# Patient Record
Sex: MALE | Race: WHITE | NOT HISPANIC OR LATINO | URBAN - METROPOLITAN AREA
[De-identification: names, ages, dates, MRNs, and addresses within clinical notes are randomized per-mention and may not be internally consistent; named-entity substitution may affect disease eponyms.]

---

## 2011-12-09 LAB — COLONOSCOPY: NORMAL

## 2017-01-28 ENCOUNTER — OFFICE VISIT (OUTPATIENT)
Dept: URGENT CARE | Facility: URGENT CARE | Age: 62
End: 2017-01-28
Payer: COMMERCIAL

## 2017-01-28 VITALS
TEMPERATURE: 97.1 F | WEIGHT: 165 LBS | HEIGHT: 73 IN | SYSTOLIC BLOOD PRESSURE: 126 MMHG | BODY MASS INDEX: 21.87 KG/M2 | OXYGEN SATURATION: 99 % | HEART RATE: 68 BPM | DIASTOLIC BLOOD PRESSURE: 83 MMHG

## 2017-01-28 DIAGNOSIS — J06.9 VIRAL UPPER RESPIRATORY TRACT INFECTION: Primary | ICD-10-CM

## 2017-01-28 PROCEDURE — 99213 OFFICE O/P EST LOW 20 MIN: CPT | Performed by: PHYSICIAN ASSISTANT

## 2017-01-28 NOTE — PROGRESS NOTES
"SUBJECTIVE:   Michele Queen is a 61 year old male presenting with a chief complaint of \"cold symptoms\", cough  and hoarse voice.  Onset of symptoms was 1 week(s) ago.  Course of illness is same.    Severity moderate  Current and Associated symptoms: sore throat  Treatment measures tried include OTC meds.  Predisposing factors include tobacco abuse last use 3 years ago. .    No past medical history on file.  Current Outpatient Prescriptions   Medication Sig Dispense Refill     NO ACTIVE MEDICATIONS        Social History   Substance Use Topics     Smoking status: Former Smoker     Types: Cigarettes     Quit date: 07/24/2014     Smokeless tobacco: Never Used     Alcohol Use: Not on file       ROS:  CONSTITUTIONAL:NEGATIVE for fever, chills, change in weight  EYES: NEGATIVE for vision changes or irritation  ENT/MOUTH: NEGATIVE for ear, mouth and throat problems  RESP:POSITIVE for cough-non productive    OBJECTIVE  :/83 mmHg  Pulse 68  Temp(Src) 97.1  F (36.2  C) (Tympanic)  Ht 6' 1\" (1.854 m)  Wt 165 lb (74.844 kg)  BMI 21.77 kg/m2  SpO2 99%  GENERAL APPEARANCE: healthy, alert and no distress  EYES: EOMI,  PERRL, conjunctiva clear  HENT: ear canals and TM's normal.  Nose and mouth without ulcers, erythema or lesions  NECK: supple, nontender, no lymphadenopathy  RESP: lungs clear to auscultation - no rales, rhonchi or wheezes  CV: regular rates and rhythm, normal S1 S2, no murmur noted  ABDOMEN:  soft, nontender, no HSM or masses and bowel sounds normal  NEURO: Normal strength and tone, sensory exam grossly normal,  normal speech and mentation  SKIN: no suspicious lesions or rashes    ASSESSMENT:    1. Viral upper respiratory tract infection      PLAN:  Cool mist vaporizer, OTC cough suppressant/expectorant and follow up with primary provider if not improving over the next week.   See orders in Epic  "

## 2017-01-28 NOTE — NURSING NOTE
"Chief Complaint   Patient presents with     Urgent Care     Cough     c/o cough for 1 week       Initial /83 mmHg  Pulse 68  Temp(Src) 97.1  F (36.2  C) (Tympanic)  Ht 6' 1\" (1.854 m)  Wt 165 lb (74.844 kg)  BMI 21.77 kg/m2  SpO2 99% Estimated body mass index is 21.77 kg/(m^2) as calculated from the following:    Height as of this encounter: 6' 1\" (1.854 m).    Weight as of this encounter: 165 lb (74.844 kg).  BP completed using cuff size: regular  Karol Zhou MA    "

## 2019-04-02 ENCOUNTER — RECORDS - HEALTHEAST (OUTPATIENT)
Dept: LAB | Facility: CLINIC | Age: 64
End: 2019-04-02

## 2019-04-02 LAB
ALBUMIN SERPL-MCNC: 4.4 G/DL (ref 3.5–5)
ALBUMIN UR-MCNC: NEGATIVE MG/DL
ALP SERPL-CCNC: 76 U/L (ref 45–120)
ALT SERPL W P-5'-P-CCNC: 22 U/L (ref 0–45)
ANION GAP SERPL CALCULATED.3IONS-SCNC: 9 MMOL/L (ref 5–18)
APPEARANCE UR: CLEAR
AST SERPL W P-5'-P-CCNC: 19 U/L (ref 0–40)
BASOPHILS # BLD AUTO: 0 THOU/UL (ref 0–0.2)
BASOPHILS NFR BLD AUTO: 1 % (ref 0–2)
BILIRUB SERPL-MCNC: 0.8 MG/DL (ref 0–1)
BILIRUB UR QL STRIP: NEGATIVE
BUN SERPL-MCNC: 14 MG/DL (ref 8–22)
CALCIUM SERPL-MCNC: 9.9 MG/DL (ref 8.5–10.5)
CHLORIDE BLD-SCNC: 102 MMOL/L (ref 98–107)
CHOLEST SERPL-MCNC: 290 MG/DL
CO2 SERPL-SCNC: 26 MMOL/L (ref 22–31)
COLOR UR AUTO: ABNORMAL
CREAT SERPL-MCNC: 0.95 MG/DL (ref 0.7–1.3)
EOSINOPHIL # BLD AUTO: 0.3 THOU/UL (ref 0–0.4)
EOSINOPHIL NFR BLD AUTO: 5 % (ref 0–6)
ERYTHROCYTE [DISTWIDTH] IN BLOOD BY AUTOMATED COUNT: 12.7 % (ref 11–14.5)
FASTING STATUS PATIENT QL REPORTED: YES
GFR SERPL CREATININE-BSD FRML MDRD: >60 ML/MIN/1.73M2
GLUCOSE BLD-MCNC: 109 MG/DL (ref 70–125)
GLUCOSE UR STRIP-MCNC: NEGATIVE MG/DL
HCT VFR BLD AUTO: 43.8 % (ref 40–54)
HDLC SERPL-MCNC: 62 MG/DL
HGB BLD-MCNC: 14.8 G/DL (ref 14–18)
HGB UR QL STRIP: NEGATIVE
KETONES UR STRIP-MCNC: NEGATIVE MG/DL
LDLC SERPL CALC-MCNC: 208 MG/DL
LEUKOCYTE ESTERASE UR QL STRIP: NEGATIVE
LYMPHOCYTES # BLD AUTO: 2.1 THOU/UL (ref 0.8–4.4)
LYMPHOCYTES NFR BLD AUTO: 33 % (ref 20–40)
MCH RBC QN AUTO: 32.8 PG (ref 27–34)
MCHC RBC AUTO-ENTMCNC: 33.8 G/DL (ref 32–36)
MCV RBC AUTO: 97 FL (ref 80–100)
MONOCYTES # BLD AUTO: 0.9 THOU/UL (ref 0–0.9)
MONOCYTES NFR BLD AUTO: 14 % (ref 2–10)
NEUTROPHILS # BLD AUTO: 3 THOU/UL (ref 2–7.7)
NEUTROPHILS NFR BLD AUTO: 48 % (ref 50–70)
NITRATE UR QL: NEGATIVE
PH UR STRIP: 7 [PH] (ref 4.5–8)
PLATELET # BLD AUTO: 278 THOU/UL (ref 140–440)
PMV BLD AUTO: 11.8 FL (ref 8.5–12.5)
POTASSIUM BLD-SCNC: 4.8 MMOL/L (ref 3.5–5)
PROT SERPL-MCNC: 7.1 G/DL (ref 6–8)
PSA SERPL-MCNC: 0.2 NG/ML (ref 0–4.5)
RBC # BLD AUTO: 4.51 MILL/UL (ref 4.4–6.2)
SODIUM SERPL-SCNC: 137 MMOL/L (ref 136–145)
SP GR UR STRIP: 1.01 (ref 1–1.03)
TRIGL SERPL-MCNC: 98 MG/DL
UROBILINOGEN UR STRIP-ACNC: ABNORMAL
WBC: 6.2 THOU/UL (ref 4–11)

## 2019-06-06 ENCOUNTER — RECORDS - HEALTHEAST (OUTPATIENT)
Dept: LAB | Facility: CLINIC | Age: 64
End: 2019-06-06

## 2019-06-06 LAB
CHOLEST SERPL-MCNC: 277 MG/DL
FASTING STATUS PATIENT QL REPORTED: YES
HDLC SERPL-MCNC: 63 MG/DL
LDLC SERPL CALC-MCNC: 200 MG/DL
TRIGL SERPL-MCNC: 70 MG/DL

## 2020-08-31 NOTE — PROGRESS NOTES
3  SUBJECTIVE:   CC: Michele Queen is an 65 year old male who presents for preventive health visit.     Healthy Habits:    Do you get at least three servings of calcium containing foods daily (dairy, green leafy vegetables, etc.)? yes    Amount of exercise or daily activities, outside of work: 4-6 day(s) per week    Problems taking medications regularly not applicable    Medication side effects: No    Have you had an eye exam in the past two years? yes    Do you see a dentist twice per year? yes    Do you have sleep apnea, excessive snoring or daytime drowsiness?no      PROBLEMS TO ADD ON...  Erectile dysfunction  - has treated this with sildenafil for years  - doesn't remember what dose he has been using  - does report he has noticed he needs to use more of the medication now than he used to  - previously spoke with his pharmacist who advised patient not to take more that 5 pills at a time (which makes me think he is taking 25mg tablets)    Today's PHQ-2 Score:   PHQ-2 (  [employer]) 2020   Q1: Little interest or pleasure in doing things 0   Q2: Feeling down, depressed or hopeless 0   PHQ-2 Score 0       Abuse: Current or Past(Physical, Sexual or Emotional)- No  Do you feel safe in your environment? Yes    Have you ever done Advance Care Planning? (For example, a Health Directive, POLST, or a discussion with a medical provider or your loved ones about your wishes): Yes, patient states has an Advance Care Planning document and will bring a copy to the clinic.    Social History     Tobacco Use     Smoking status: Former Smoker     Types: Cigarettes     Last attempt to quit: 2014     Years since quittin.1     Smokeless tobacco: Never Used   Substance Use Topics     Alcohol use: Not on file     If you drink alcohol do you typically have >3 drinks per day or >7 drinks per week? No                      Last PSA: No results found for: PSA    Reviewed orders with patient. Reviewed health maintenance and  "updated orders accordingly - Yes    Colonoscopy history:   - first colonoscopy in mid 50s, found \"polyps\" and advised to return for repeat colonoscopy in 3 years, which he did  - second colonoscopy was negative, patient told to return in 5 years, which is now  - patient believes in \"minimal\" medicine and would prefer a FIT test today and going forward    Prostate   - thinks he has had PSA testing in the past  - but again, prefers minimal testing; he denies any concerning symptoms and declines repeat PSA today    Reviewed and updated as needed this visit by clinical staff  Tobacco  Allergies  Meds  Problems  Med Hx  Surg Hx  Fam Hx         Reviewed and updated as needed this visit by Provider  Allergies  Meds  Problems  Med Hx  Surg Hx        Past Medical History:   Diagnosis Date     High cholesterol 9/1/2020     Other male erectile dysfunction 9/1/2020     Psoriasis 9/1/2020      Past Surgical History:   Procedure Laterality Date     EYE SURGERY         ROS:  CONSTITUTIONAL: NEGATIVE for fever, chills, change in weight  INTEGUMENTARY/SKIN: NEGATIVE for worrisome rashes, moles or lesions  EYES: NEGATIVE for vision changes or irritation  ENT: NEGATIVE for ear, mouth and throat problems  RESP: NEGATIVE for significant cough or SOB  CV: NEGATIVE for chest pain, palpitations or peripheral edema  GI: NEGATIVE for nausea, abdominal pain, heartburn, or change in bowel habits   male: history of erectile dysfunction.  negative for dysuria, hematuria, decreased urinary stream, urethral discharge  MUSCULOSKELETAL: NEGATIVE for significant arthralgias or myalgia  NEURO: NEGATIVE for weakness, dizziness or paresthesias  PSYCHIATRIC: NEGATIVE for changes in mood or affect    OBJECTIVE:   BP (!) 146/90 (BP Location: Right arm, Patient Position: Sitting, Cuff Size: Adult Regular)   Pulse 81   Temp 98.1  F (36.7  C) (Skin)   Resp 15   Ht 1.861 m (6' 1.27\")   Wt 79.8 kg (176 lb)   SpO2 98%   BMI 23.05 kg/m   "     Borderline blood pressure, no repeat check was done.      EXAM:  GENERAL: healthy, alert and no distress  EYES: Eyes grossly normal to inspection, PERRL and conjunctivae and sclerae normal  HENT: ear canals and TM's normal, nose and mouth without ulcers or lesions  NECK: no adenopathy, no asymmetry, masses, or scars and thyroid normal to palpation  RESP: lungs clear to auscultation - no rales, rhonchi or wheezes  CV: regular rate and rhythm, normal S1 S2, no S3 or S4, no murmur, click or rub, no peripheral edema and peripheral pulses strong  ABDOMEN: soft, nontender, no hepatosplenomegaly, no masses and bowel sounds normal  MS: no gross musculoskeletal defects noted, no edema  SKIN: no suspicious lesions or rashes  NEURO: Normal strength and tone, mentation intact and speech normal  PSYCH: mentation appears normal, affect normal/bright    Diagnostic Test Results:  Labs reviewed in Epic    ASSESSMENT/PLAN:   Michele was seen today for physical.    Diagnoses and all orders for this visit:    Routine general medical examination at a health care facility    Screening for lipid disorders  -     Lipid Panel ([place])  -     Basic Metabolic Panel ([place])    Screen for colon cancer  -     Fecal colorectal cancer screen FIT - Future (S+30); Future    Need for Tdap vaccination    Other male erectile dysfunction    Discussed ED as noted above. Advised patient to contact me with actual mg strength of his sildenafil. Advised patient if he continues to need increased doses of medication we might want to consider referral to urology for further assessment and alternative treatment options.       COUNSELING:  Reviewed preventive health counseling, as reflected in patient instructions  Special attention given to:        Regular exercise       Healthy diet/nutrition       Vision screening       Hearing screening       Immunizations    Declined: TDAP           Colon cancer screening       Prostate cancer screening        "Advance Care Planning    Estimated body mass index is 21.77 kg/m  as calculated from the following:    Height as of 1/28/17: 1.854 m (6' 1\").    Weight as of 1/28/17: 74.8 kg (165 lb).        He reports that he quit smoking about 6 years ago. His smoking use included cigarettes. He has never used smokeless tobacco.      Counseling Resources:  ATP IV Guidelines  Pooled Cohorts Equation Calculator  FRAX Risk Assessment  ICSI Preventive Guidelines  Dietary Guidelines for Americans, 2010  USDA's MyPlate  ASA Prophylaxis  Lung CA Screening    Toan Vitale MD  [facility]  "

## 2020-09-01 ENCOUNTER — OFFICE VISIT (OUTPATIENT)
Dept: FAMILY MEDICINE | Facility: CLINIC | Age: 65
End: 2020-09-01
Payer: COMMERCIAL

## 2020-09-01 VITALS
OXYGEN SATURATION: 98 % | SYSTOLIC BLOOD PRESSURE: 146 MMHG | RESPIRATION RATE: 15 BRPM | HEIGHT: 73 IN | TEMPERATURE: 98.1 F | HEART RATE: 81 BPM | WEIGHT: 176 LBS | BODY MASS INDEX: 23.33 KG/M2 | DIASTOLIC BLOOD PRESSURE: 90 MMHG

## 2020-09-01 DIAGNOSIS — Z12.11 SCREEN FOR COLON CANCER: ICD-10-CM

## 2020-09-01 DIAGNOSIS — Z13.220 SCREENING FOR LIPID DISORDERS: ICD-10-CM

## 2020-09-01 DIAGNOSIS — N52.8 OTHER MALE ERECTILE DYSFUNCTION: ICD-10-CM

## 2020-09-01 DIAGNOSIS — Z00.00 ROUTINE GENERAL MEDICAL EXAMINATION AT A HEALTH CARE FACILITY: Primary | ICD-10-CM

## 2020-09-01 DIAGNOSIS — Z23 NEED FOR TDAP VACCINATION: ICD-10-CM

## 2020-09-01 DIAGNOSIS — R73.09 ELEVATED GLUCOSE: ICD-10-CM

## 2020-09-01 PROBLEM — E78.00 HIGH CHOLESTEROL: Status: ACTIVE | Noted: 2020-09-01

## 2020-09-01 PROBLEM — L40.9 PSORIASIS: Status: ACTIVE | Noted: 2020-09-01

## 2020-09-01 LAB
BUN SERPL-MCNC: 13 MG/DL (ref 7–30)
CALCIUM SERPL-MCNC: 9.6 MG/DL (ref 8.5–10.4)
CHLORIDE SERPLBLD-SCNC: 100 MMOL/L (ref 94–109)
CHOLEST SERPL-MCNC: 207 MG/DL (ref 0–200)
CHOLEST/HDLC SERPL: 2.8 {RATIO} (ref 0–5)
CO2 SERPL-SCNC: 29 MMOL/L (ref 20–32)
CREAT SERPL-MCNC: 1 MG/DL (ref 0.8–1.5)
EGFR CALCULATED (BLACK REFERENCE): 96.7
EGFR CALCULATED (NON BLACK REFERENCE): 80
FASTING SPECIMEN: YES
GLUCOSE SERPL-MCNC: 109 MG/DL (ref 60–99)
HBA1C MFR BLD: 5.2 % (ref 4.1–5.7)
HDLC SERPL-MCNC: 74 MG/DL
LDLC SERPL CALC-MCNC: 116 MG/DL (ref 0–129)
POTASSIUM SERPL-SCNC: 4.7 MMOL/L (ref 3.4–5.3)
SODIUM SERPL-SCNC: 138 MMOL/L (ref 137.3–146.3)
TRIGL SERPL-MCNC: 87 MG/DL (ref 0–150)
VLDL-CHOLESTEROL: 17 (ref 7–32)

## 2020-09-01 RX ORDER — TRIAMCINOLONE ACETONIDE 5 MG/G
OINTMENT TOPICAL 2 TIMES DAILY
COMMUNITY
End: 2021-06-25

## 2020-09-01 RX ORDER — ATORVASTATIN CALCIUM 10 MG/1
10 TABLET, FILM COATED ORAL DAILY
COMMUNITY
End: 2020-10-21

## 2020-09-01 SDOH — HEALTH STABILITY: MENTAL HEALTH: HOW MANY DRINKS CONTAINING ALCOHOL DO YOU HAVE ON A TYPICAL DAY WHEN YOU ARE DRINKING?: 1 OR 2

## 2020-09-01 SDOH — HEALTH STABILITY: MENTAL HEALTH: HOW OFTEN DO YOU HAVE A DRINK CONTAINING ALCOHOL?: 2-3 TIMES A WEEK

## 2020-09-01 SDOH — HEALTH STABILITY: MENTAL HEALTH: HOW MANY STANDARD DRINKS CONTAINING ALCOHOL DO YOU HAVE ON A TYPICAL DAY?: 1 OR 2

## 2020-09-01 SDOH — HEALTH STABILITY: MENTAL HEALTH: HOW OFTEN DO YOU HAVE 6 OR MORE DRINKS ON ONE OCCASION?: NEVER

## 2020-09-01 SDOH — HEALTH STABILITY: MENTAL HEALTH: HOW OFTEN DO YOU HAVE SIX OR MORE DRINKS ON ONE OCCASION?: NEVER

## 2020-09-01 ASSESSMENT — MIFFLIN-ST. JEOR: SCORE: 1646.46

## 2020-09-01 NOTE — NURSING NOTE
"65 year old  Chief Complaint   Patient presents with     Physical     no other concerns       Blood pressure (!) 146/90, pulse 81, temperature 98.1  F (36.7  C), temperature source Skin, resp. rate 15, height 1.861 m (6' 1.27\"), weight 79.8 kg (176 lb), SpO2 98 %. Body mass index is 23.05 kg/m .  There is no problem list on file for this patient.      Wt Readings from Last 2 Encounters:   20 79.8 kg (176 lb)   17 74.8 kg (165 lb)     BP Readings from Last 3 Encounters:   20 (!) 146/90   17 126/83   16 110/80         Current Outpatient Medications   Medication     atorvastatin (LIPITOR) 10 MG tablet     triamcinolone (KENALOG) 0.5 % external ointment     NO ACTIVE MEDICATIONS     No current facility-administered medications for this visit.        Social History     Tobacco Use     Smoking status: Former Smoker     Types: Cigarettes     Last attempt to quit: 2014     Years since quittin.1     Smokeless tobacco: Never Used   Substance Use Topics     Alcohol use: Yes     Alcohol/week: 0.0 standard drinks     Frequency: 2-3 times a week     Drinks per session: 1 or 2     Binge frequency: Never     Drug use: Never       Health Maintenance Due   Topic Date Due     PREVENTIVE CARE VISIT  1956     HEPATITIS C SCREENING  1956     ADVANCE CARE PLANNING  1956     COLORECTAL CANCER SCREENING  1966     HIV SCREENING  1971     DTAP/TDAP/TD IMMUNIZATION (1 - Tdap) 1981     LIPID  1991     ZOSTER IMMUNIZATION (1 of 2) 2006     PHQ-2  2020     INFLUENZA VACCINE (1) 2020       No results found for: PAP      2020 9:10 AM    "

## 2020-09-01 NOTE — LETTER
September 4, 2020      Michele Queen  [address] [place] [address]        Magali Jamesald,     Here are the results from your labs in clinic the other day. Everything looks good. I don't see anything in your results that I am concerned about. I don't have results on the FIT test yet, but I will let you know as soon as I do. Feel free to contact me here if you have any questions about these results.     Best Wishes,     Toan Vitale MD   5:51 PM, September 3, 2020     Resulted Orders   Lipid Panel ([place])   Result Value Ref Range    FASTING SPECIMEN yes     Cholesterol 207.0 (H) 0.0 - 200.0    HDL Cholesterol 74.0 >40.0    Triglycerides 87.0 0.0 - 150.0    Cholesterol/HDL Ratio 2.8 0.0 - 5.0    LDL Cholesterol Direct 116.0 0.0 - 129.0    VLDL-Cholesterol 17.0 7.0 - 32.0   Basic Metabolic Panel ([place])   Result Value Ref Range    Glucose 109.0 (H) 60.0 - 99.0 mg/dL    Urea Nitrogen 13.0 7.0 - 30.0 mg/dL    Calcium 9.6 8.5 - 10.4 mg/dL    Creatinine 1.0 0.8 - 1.5 mg/dL    eGFR Calculated (Non Black Reference) 80.0 >60.0    eGFR Calculated (Black Reference) 96.7 >60.0    Sodium 138.0 137.3 - 146.3 mmol/L    Potassium 4.7 3.4 - 5.3 mmol/L    Chloride 100.0 94.0 - 109.0 mmol/L    Carbon Dioxide 29.0 20.0 - 32.0 mmol/L   Hemoglobin A1c ([place])   Result Value Ref Range    Hemoglobin A1C 5.2 4.1 - 5.7 %

## 2020-10-20 ENCOUNTER — MYC MEDICAL ADVICE (OUTPATIENT)
Dept: FAMILY MEDICINE | Facility: CLINIC | Age: 65
End: 2020-10-20

## 2020-10-20 DIAGNOSIS — E78.00 HIGH CHOLESTEROL: Primary | ICD-10-CM

## 2020-10-21 RX ORDER — ATORVASTATIN CALCIUM 10 MG/1
10 TABLET, FILM COATED ORAL DAILY
Qty: 90 TABLET | Refills: 3 | Status: SHIPPED | OUTPATIENT
Start: 2020-10-21 | End: 2021-11-08

## 2020-10-21 NOTE — TELEPHONE ENCOUNTER
[facility] Agreed to refill atorvastatin as noted below.     Diagnoses and all orders for this visit:    High cholesterol  -     atorvastatin (LIPITOR) 10 MG tablet; Take 1 tablet (10 mg) by mouth daily      Toan Vitale MD  1:17 PM, October 21, 2020

## 2020-11-05 ENCOUNTER — HOSPITAL ENCOUNTER (OUTPATIENT)
Facility: CLINIC | Age: 65
Setting detail: SPECIMEN
Discharge: HOME | End: 2020-11-05
Admitting: FAMILY MEDICINE

## 2020-11-05 PROCEDURE — 82274 ASSAY TEST FOR BLOOD FECAL: CPT | Performed by: FAMILY MEDICINE

## 2020-11-07 DIAGNOSIS — Z12.11 SCREEN FOR COLON CANCER: ICD-10-CM

## 2020-11-07 LAB — HEMOCCULT STL QL IA: NEGATIVE

## 2020-11-11 ENCOUNTER — MYC MEDICAL ADVICE (OUTPATIENT)
Dept: FAMILY MEDICINE | Facility: CLINIC | Age: 65
End: 2020-11-11

## 2020-11-11 DIAGNOSIS — N52.8 OTHER MALE ERECTILE DYSFUNCTION: Primary | ICD-10-CM

## 2020-11-13 RX ORDER — SILDENAFIL 50 MG/1
50-100 TABLET, FILM COATED ORAL DAILY PRN
Qty: 30 TABLET | Refills: 3 | Status: SHIPPED | OUTPATIENT
Start: 2020-11-13 | End: 2021-06-16

## 2020-11-13 NOTE — TELEPHONE ENCOUNTER
[facility] correspondence: patient requesting refill of Sildenafil. Has been taking this medication for years without complication although does report needing to take larger doses over time. Discussed the option of meeting with urology but patient would like to hold off on this for now.    Diagnoses and all orders for this visit:    Other male erectile dysfunction  -     sildenafil (VIAGRA) 50 MG tablet; Take 1-2 tablets ( mg) by mouth daily as needed (Erectile Dysfunction)      Toan Vitale MD  8:12 AM, November 13, 2020

## 2020-12-14 ENCOUNTER — HEALTH MAINTENANCE LETTER (OUTPATIENT)
Age: 65
End: 2020-12-14

## 2021-06-15 DIAGNOSIS — N52.8 OTHER MALE ERECTILE DYSFUNCTION: ICD-10-CM

## 2021-06-15 NOTE — TELEPHONE ENCOUNTER
Last time prescribed: 11/13/20 , 30 tabs/caps x 3 refills  Last office visit: 9/1/20  Next appointment: No Future Appointment Scheduled    Prescription approved per [facility] Refill Protocol.  Jennifer Escudero RN  [facility]

## 2021-06-16 RX ORDER — SILDENAFIL 50 MG/1
50-100 TABLET, FILM COATED ORAL DAILY PRN
Qty: 30 TABLET | Refills: 3 | Status: SHIPPED | OUTPATIENT
Start: 2021-06-16 | End: 2022-02-21

## 2021-06-25 DIAGNOSIS — L40.9 PSORIASIS: Primary | ICD-10-CM

## 2021-06-25 RX ORDER — TACROLIMUS 1 MG/G
OINTMENT TOPICAL 2 TIMES DAILY
Qty: 30 G | Refills: 2 | Status: SHIPPED | OUTPATIENT
Start: 2021-06-25 | End: 2022-07-14

## 2021-06-25 NOTE — TELEPHONE ENCOUNTER
Last Office Visit: 9/1/20- new pt to [facility]  Future [facility] Appointments: None  Medication last refilled:per pharmacy ( from  Previous MD Kiran Preciado, script of 5/2020),  for 30 g on 3/8/21     Routing refill request to provider for review/approval because:  Drug not active on patient's medication list    We have never filled this med for pt.  Past MD did as noted above.    Pt using for psoriasis. Spoke with pt, he uses sparingly when gets his flares of psoriasis.       Nirmala Gutierrez RN  June 25, 2021 9:39 AM

## 2021-06-25 NOTE — TELEPHONE ENCOUNTER
Agreed to refill med as noted below.    Michele was seen today for refill request.    Diagnoses and all orders for this visit:    Psoriasis  -     tacrolimus (PROTOPIC) 0.1 % external ointment; Apply topically 2 times daily As needed, to affected skin, using small amount.      Toan Vitale MD  9:57 AM, June 25, 2021

## 2021-07-06 ENCOUNTER — TELEPHONE (OUTPATIENT)
Dept: FAMILY MEDICINE | Facility: CLINIC | Age: 66
End: 2021-07-06

## 2021-07-06 NOTE — TELEPHONE ENCOUNTER
Prior Authorization Approval    Authorization Effective Date: 4/7/2021  Authorization Expiration Date: 7/6/2022  Medication: tacrolimus ointment 0.1%-PA APPROVED   Approved Dose/Quantity:   Reference #:     Insurance Company: [employer] [place] - Phone [phone] Fax [phone]  Expected CoPay:       CoPay Card Available:      Foundation Assistance Needed:    Which Pharmacy is filling the prescription (Not needed for infusion/clinic administered): [facility] - [place], [place] - [address]  Pharmacy Notified: Yes- **Instructed pharmacy to notify patient when script is ready to /ship.**  Patient Notified: Yes

## 2021-07-06 NOTE — TELEPHONE ENCOUNTER
Prior Authorization Retail Medication Request    Medication/Dose: tacrolimus ointment 0.1%  ICD code (if different than what is on RX):  same  Previously Tried and Failed:    Rationale:      Insurance Name:  same  Insurance ID:  same      Pharmacy Information (if different than what is on RX)  Name:  [facility]  Phone:  Same  Jennifer Escudero RN  [facility]

## 2021-07-06 NOTE — TELEPHONE ENCOUNTER
Central Prior Authorization Team   [phone]    PA Initiation    Medication: tacrolimus ointment 0.1%  Insurance Company: [facility] - Phone [phone] Fax [phone]  Pharmacy Filling the Rx: [facility] - [place], [place] - [facility] [address]  Filling Pharmacy Phone: [phone]  Filling Pharmacy Fax: [phone]  Start Date: 7/6/2021

## 2021-08-23 ENCOUNTER — OFFICE VISIT (OUTPATIENT)
Dept: FAMILY MEDICINE | Facility: CLINIC | Age: 66
End: 2021-08-23
Payer: COMMERCIAL

## 2021-08-23 VITALS
BODY MASS INDEX: 23.03 KG/M2 | WEIGHT: 173.75 LBS | TEMPERATURE: 97.2 F | OXYGEN SATURATION: 99 % | DIASTOLIC BLOOD PRESSURE: 69 MMHG | HEIGHT: 73 IN | SYSTOLIC BLOOD PRESSURE: 100 MMHG | RESPIRATION RATE: 16 BRPM | HEART RATE: 86 BPM

## 2021-08-23 DIAGNOSIS — Z00.00 ROUTINE GENERAL MEDICAL EXAMINATION AT A HEALTH CARE FACILITY: Primary | ICD-10-CM

## 2021-08-23 DIAGNOSIS — Z12.5 SCREENING FOR PROSTATE CANCER: ICD-10-CM

## 2021-08-23 DIAGNOSIS — Z13.6 SCREENING FOR AAA (ABDOMINAL AORTIC ANEURYSM): ICD-10-CM

## 2021-08-23 DIAGNOSIS — Z13.220 LIPID SCREENING: ICD-10-CM

## 2021-08-23 LAB
ANION GAP SERPL CALCULATED.3IONS-SCNC: 3 MMOL/L (ref 3–14)
BUN SERPL-MCNC: 12 MG/DL (ref 7–30)
CALCIUM SERPL-MCNC: 8.9 MG/DL (ref 8.5–10.1)
CHLORIDE BLD-SCNC: 104 MMOL/L (ref 94–109)
CHOLEST SERPL-MCNC: 184 MG/DL
CO2 SERPL-SCNC: 30 MMOL/L (ref 20–32)
CREAT SERPL-MCNC: 0.91 MG/DL (ref 0.66–1.25)
FASTING STATUS PATIENT QL REPORTED: YES
GFR SERPL CREATININE-BSD FRML MDRD: 88 ML/MIN/1.73M2
GLUCOSE BLD-MCNC: 100 MG/DL (ref 70–99)
HDLC SERPL-MCNC: 70 MG/DL
LDLC SERPL CALC-MCNC: 104 MG/DL
NONHDLC SERPL-MCNC: 114 MG/DL
POTASSIUM BLD-SCNC: 5.2 MMOL/L (ref 3.4–5.3)
PSA SERPL-MCNC: 0.23 UG/L (ref 0–4)
SODIUM SERPL-SCNC: 137 MMOL/L (ref 133–144)
TRIGL SERPL-MCNC: 51 MG/DL

## 2021-08-23 PROCEDURE — 80061 LIPID PANEL: CPT | Performed by: FAMILY MEDICINE

## 2021-08-23 PROCEDURE — G0103 PSA SCREENING: HCPCS | Performed by: FAMILY MEDICINE

## 2021-08-23 PROCEDURE — 80048 BASIC METABOLIC PNL TOTAL CA: CPT | Performed by: FAMILY MEDICINE

## 2021-08-23 ASSESSMENT — PATIENT HEALTH QUESTIONNAIRE - PHQ9
5. POOR APPETITE OR OVEREATING: NOT AT ALL
SUM OF ALL RESPONSES TO PHQ QUESTIONS 1-9: 0

## 2021-08-23 ASSESSMENT — ANXIETY QUESTIONNAIRES
5. BEING SO RESTLESS THAT IT IS HARD TO SIT STILL: NOT AT ALL
1. FEELING NERVOUS, ANXIOUS, OR ON EDGE: NOT AT ALL
3. WORRYING TOO MUCH ABOUT DIFFERENT THINGS: NOT AT ALL
2. NOT BEING ABLE TO STOP OR CONTROL WORRYING: NOT AT ALL
7. FEELING AFRAID AS IF SOMETHING AWFUL MIGHT HAPPEN: NOT AT ALL
GAD7 TOTAL SCORE: 0
6. BECOMING EASILY ANNOYED OR IRRITABLE: NOT AT ALL

## 2021-08-23 ASSESSMENT — MIFFLIN-ST. JEOR: SCORE: 1630.61

## 2021-08-23 NOTE — PATIENT INSTRUCTIONS
Preventive Health Recommendations:     See your health care provider every year to    Review health changes.     Discuss preventive care.      Review your medicines if your doctor has prescribed any.      Talk with your health care provider about whether you should have a test to screen for prostate cancer (PSA).    Every 3 years, have a diabetes test (fasting glucose). If you are at risk for diabetes, you should have this test more often.    Every 5 years, have a cholesterol test. Have this test more often if you are at risk for high cholesterol or heart disease.     Every 10 years, have a colonoscopy. Or, have a yearly FIT test (stool test). These exams will check for colon cancer.    Talk to with your health care provider about screening for Abdominal Aortic Aneurysm if you have a family history of AAA or have a history of smoking.    Shots:     Get a flu shot each year.     Get a tetanus shot every 10 years.     Talk to your doctor about your pneumonia vaccines. There are now two you should receive - Pneumovax (PPSV 23) and Prevnar (PCV 13).     Talk to your pharmacist about a shingles vaccine.     Talk to your doctor about the hepatitis B vaccine.  Nutrition:     Eat at least 5 servings of fruits and vegetables each day.     Eat whole-grain bread, whole-wheat pasta and brown rice instead of white grains and rice.     Get adequate Calcium and Vitamin D.   Lifestyle    Exercise for at least 150 minutes a week (30 minutes a day, 5 days a week). This will help you control your weight and prevent disease.     Limit alcohol to one drink per day.     No smoking.     Wear sunscreen to prevent skin cancer.    See your dentist every six months for an exam and cleaning.    See your eye doctor every 1 to 2 years to screen for conditions such as glaucoma, macular degeneration, cataracts, etc.    Personalized Prevention Plan  You are due for the preventive services outlined below.  Your care team is available to assist you  in scheduling these services.  If you have already completed any of these items, please share that information with your care team to update in your medical record.  Health Maintenance Due   Topic Date Due     HIV Screening  Never done     Hepatitis C Screening  Never done     Diptheria Tetanus Pertussis (DTAP/TDAP/TD) Vaccine (1 - Tdap) Never done     Zoster (Shingles) Vaccine (1 of 2) Never done     Colorectal Cancer Screening  11/06/2020     FALL RISK ASSESSMENT  Never done     AORTIC ANEURYSM SCREENING (SYSTEM ASSIGNED)  Never done     Pneumococcal Vaccine (1 of 1 - PPSV23) 04/12/2021

## 2021-08-23 NOTE — NURSING NOTE
"66 year old  Chief Complaint   Patient presents with     Medicare Visit     66 yrs old fasting        Blood pressure 100/69, pulse 86, temperature 97.2  F (36.2  C), temperature source Temporal, resp. rate 16, height 1.86 m (6' 1.23\"), weight 78.8 kg (173 lb 12 oz), SpO2 99 %. Body mass index is 22.78 kg/m .  Patient Active Problem List   Diagnosis     High cholesterol     Psoriasis     Other male erectile dysfunction       Wt Readings from Last 2 Encounters:   21 78.8 kg (173 lb 12 oz)   20 79.8 kg (176 lb)     BP Readings from Last 3 Encounters:   21 100/69   20 (!) 146/90   17 126/83         Current Outpatient Medications   Medication     atorvastatin (LIPITOR) 10 MG tablet     sildenafil (VIAGRA) 50 MG tablet     tacrolimus (PROTOPIC) 0.1 % external ointment     NO ACTIVE MEDICATIONS     No current facility-administered medications for this visit.       Social History     Tobacco Use     Smoking status: Former Smoker     Types: Cigarettes     Quit date: 2014     Years since quittin.0     Smokeless tobacco: Never Used   Substance Use Topics     Alcohol use: Yes     Alcohol/week: 0.0 standard drinks     Drug use: Never       Health Maintenance Due   Topic Date Due     HIV SCREENING  Never done     HEPATITIS C SCREENING  Never done     DTAP/TDAP/TD IMMUNIZATION (1 - Tdap) Never done     ZOSTER IMMUNIZATION (1 of 2) Never done     COLORECTAL CANCER SCREENING  2020     PHQ-2  2021     FALL RISK ASSESSMENT  Never done     AORTIC ANEURYSM SCREENING (SYSTEM ASSIGNED)  Never done     Pneumococcal Vaccine: 65+ Years (1 of 1 - PPSV23) 2021     MEDICARE ANNUAL WELLNESS VISIT  2021       No results found for: PAP      2021 8:30 AM  "

## 2021-08-23 NOTE — PROGRESS NOTES
SUBJECTIVE:   Michele Queen is a 66 year old male who presents for Preventive Visit.    Patient has been advised of split billing requirements and indicates understanding: Yes   Are you in the first 12 months of your Medicare coverage?  Yes. Vision screen deferred today.     HPI   - 66 year old male  - eats a healthy diet, does most of the cooking, lots of green leafy vegetables, lean meats  - exercises most days, likes to ride bike  - takes 100mg of sildenafil for effect, concerned this may not be enough in the future. Otherwise no concerns or side effects with medications  - sees a dentist and eye doctor regularly  - no concerns for sleep apnea        Do you feel safe in your environment? Yes    Have you ever done Advance Care Planning? (For example, a Health Directive, POLST, or a discussion with a medical provider or your loved ones about your wishes): Yes, patient states has an Advance Care Planning document and will bring a copy to the clinic.      Fall risk  Fallen 2 or more times in the past year?: No  Any fall with injury in the past year?: No    Cognitive Screening   No concerns for cognitive decline at today's visit    Do you have sleep apnea, excessive snoring or daytime drowsiness?: no    Reviewed and updated as needed this visit by clinical staff  Tobacco  Allergies  Meds              Reviewed and updated as needed this visit by Provider                Social History     Tobacco Use     Smoking status: Former Smoker     Types: Cigarettes     Quit date: 2014     Years since quittin.0     Smokeless tobacco: Never Used   Substance Use Topics     Alcohol use: Yes     Alcohol/week: 0.0 standard drinks     If you drink alcohol do you typically have >3 drinks per day or >7 drinks per week? No        PROBLEMS TO ADD ON...  Issues today  ED as noted above.   - concerned the 100mg sildenafil may start to become less effective over time  - curious about other possible treatments    Does he need to  "keep taking atorvastatin?  - reviewed his previous lipids with him   - marked decline in LDL and increase in HDL with low dose Lipitor  - denies any concerning side effects    Current providers sharing in care for this patient include:   Patient Care Team:  Toan Vitale MD as PCP - General (Family Medicine)  Toan Vitale MD as Assigned PCP    The following health maintenance items are reviewed in Epic and correct as of today:  Health Maintenance Due   Topic Date Due     HIV SCREENING  Never done     HEPATITIS C SCREENING  Never done     DTAP/TDAP/TD IMMUNIZATION (1 - Tdap) Never done     ZOSTER IMMUNIZATION (1 of 2) Never done     COLORECTAL CANCER SCREENING  11/06/2020     FALL RISK ASSESSMENT  Never done     AORTIC ANEURYSM SCREENING (SYSTEM ASSIGNED)  Never done     Pneumococcal Vaccine: 65+ Years (1 of 1 - PPSV23) 04/12/2021       Review of Systems  Constitutional, HEENT, cardiovascular, pulmonary, gi and gu systems are negative, except as otherwise noted.    OBJECTIVE:   /69 (BP Location: Left arm, Patient Position: Sitting, Cuff Size: Adult Large)   Pulse 86   Temp 97.2  F (36.2  C) (Temporal)   Resp 16   Ht 1.86 m (6' 1.23\")   Wt 78.8 kg (173 lb 12 oz)   SpO2 99%   BMI 22.78 kg/m   Estimated body mass index is 22.78 kg/m  as calculated from the following:    Height as of this encounter: 1.86 m (6' 1.23\").    Weight as of this encounter: 78.8 kg (173 lb 12 oz).  Physical Exam  GENERAL: healthy, alert and no distress  EYES: Eyes grossly normal to inspection, PERRL and conjunctivae and sclerae normal  HENT: ear canals and TM's normal, nose and mouth without ulcers or lesions  NECK: no adenopathy, no asymmetry, masses, or scars and thyroid normal to palpation  RESP: lungs clear to auscultation - no rales, rhonchi or wheezes  CV: regular rate and rhythm, normal S1 S2, no S3 or S4, no murmur, click or rub, no peripheral edema and peripheral pulses strong  ABDOMEN: soft, nontender, no " "hepatosplenomegaly, no masses and bowel sounds normal  MS: no gross musculoskeletal defects noted, no edema  SKIN: no suspicious lesions or rashes  NEURO: Normal strength and tone, mentation intact and speech normal  PSYCH: mentation appears normal, affect normal/bright    Diagnostic Test Results:  Labs reviewed in Epic    ASSESSMENT / PLAN:   Michele was seen today for medicare visit.    Diagnoses and all orders for this visit:    Routine general medical examination at a health care facility  -     Basic metabolic panel; Future  -     Cancel: Prostate spec antigen screen; Future  -     Prostate spec antigen screen; Future  -     Basic metabolic panel  -     Prostate spec antigen screen    Lipid screening  -     Lipid Profile; Future  -     Lipid Profile    Screening for prostate cancer  -     Prostate spec antigen screen; Future  -     Prostate spec antigen screen    After discussion and review of improvement in lipids with atorvastatin, patient agrees to continue with statin for now.     Discussed possible options for refractory ED including injections and prosthetic. Suggested possible referral to Urology. Patient will consider this option and get back to me.     Patient has been advised of split billing requirements and indicates understanding: Yes     COUNSELING:  Reviewed preventive health counseling, as reflected in patient instructions    Estimated body mass index is 22.78 kg/m  as calculated from the following:    Height as of this encounter: 1.86 m (6' 1.23\").    Weight as of this encounter: 78.8 kg (173 lb 12 oz).        He reports that he quit smoking about 7 years ago. His smoking use included cigarettes. He has never used smokeless tobacco.      Appropriate preventive services were discussed with this patient, including applicable screening as appropriate for cardiovascular disease, diabetes, osteopenia/osteoporosis, and glaucoma.  As appropriate for age/gender, discussed screening for colorectal " cancer, prostate cancer, breast cancer, and cervical cancer. Checklist reviewing preventive services available has been given to the patient.    Reviewed patients plan of care and provided an AVS. The Basic Care Plan (routine screening as documented in Health Maintenance) for Michele meets the Care Plan requirement. This Care Plan has been established and reviewed with the Patient.    Counseling Resources:  ATP IV Guidelines  Pooled Cohorts Equation Calculator  Breast Cancer Risk Calculator  Breast Cancer: Medication to Reduce Risk  FRAX Risk Assessment  ICSI Preventive Guidelines  Dietary Guidelines for Americans, 2010  USDA's MyPlate  ASA Prophylaxis  Lung CA Screening    Toan Vitale MD  [facility]

## 2021-08-24 ASSESSMENT — ANXIETY QUESTIONNAIRES: GAD7 TOTAL SCORE: 0

## 2021-10-02 ENCOUNTER — HEALTH MAINTENANCE LETTER (OUTPATIENT)
Age: 66
End: 2021-10-02

## 2021-11-08 DIAGNOSIS — E78.00 HIGH CHOLESTEROL: ICD-10-CM

## 2021-11-08 RX ORDER — ATORVASTATIN CALCIUM 10 MG/1
10 TABLET, FILM COATED ORAL DAILY
Qty: 90 TABLET | Refills: 2 | Status: SHIPPED | OUTPATIENT
Start: 2021-11-08 | End: 2022-07-14

## 2021-11-08 NOTE — TELEPHONE ENCOUNTER
Medication requested: atorvastatin (LIPITOR) 10 MG tablet  Last office visit: 8/23/21  [facility] appointments: none  Medication last refilled: 10/21/20 #90 + 3 refills  Last qualifying labs: 8/23/21  Prescription approved per Refill Protocol.  Amber Hooker MS RN-BC  11/08/21  10:30 AM

## 2022-02-21 DIAGNOSIS — N52.8 OTHER MALE ERECTILE DYSFUNCTION: ICD-10-CM

## 2022-02-21 RX ORDER — SILDENAFIL 50 MG/1
50-100 TABLET, FILM COATED ORAL DAILY PRN
Qty: 30 TABLET | Refills: 3 | Status: SHIPPED | OUTPATIENT
Start: 2022-02-21 | End: 2024-03-28

## 2022-02-21 NOTE — TELEPHONE ENCOUNTER
Sildenafil (Viagra) 50 mg    Last Office Visit: 8/23/21  [place] [facility] [place]  Medication last refilled: 6/16/21 #30 with 3 refill(s)    Vital Signs 1/28/2017 9/1/2020 8/23/2021   Systolic 126 146 100   Diastolic 83 90 69     Prescription approved per [facility] Refill Protocol.    Kenya White, RN, BSN

## 2022-07-13 DIAGNOSIS — E78.00 HIGH CHOLESTEROL: ICD-10-CM

## 2022-07-13 DIAGNOSIS — L40.9 PSORIASIS: ICD-10-CM

## 2022-07-14 RX ORDER — TACROLIMUS 1 MG/G
OINTMENT TOPICAL 2 TIMES DAILY
Qty: 30 G | Refills: 0 | Status: SHIPPED | OUTPATIENT
Start: 2022-07-14 | End: 2023-09-12

## 2022-07-14 RX ORDER — ATORVASTATIN CALCIUM 10 MG/1
10 TABLET, FILM COATED ORAL DAILY
Qty: 30 TABLET | Refills: 0 | Status: SHIPPED | OUTPATIENT
Start: 2022-07-14 | End: 2022-11-18

## 2022-07-14 NOTE — TELEPHONE ENCOUNTER
Atorvastatin (Lipitor) 10 mg + Tacrolimus (Protopic) 0.1%    Last Office Visit: 8/23/21  Future [facility] Appointments: None  Medication last refilled:     11/8/21 #90 with 2 refill(s)-Atorvastatin  6/25/21 #30 g with 2 refill(s)-Tacrolimus    Vital Signs 1/28/2017 9/1/2020 8/23/2021   Systolic 126 146 100   Diastolic 83 90 69     Required labs per protocol:    LAB REF RANGE 9/1/20 8/23/21   LDL < 100 mg/dL 116 High 104 High     Medication is being filled for 1 time refill only due to:  Patient needs labs Lipid panel. Patient needs to be seen because due for yearly recheck .    Plandree message sent to Don to call and schedule an appointment.    TONYA FaithN, RN, CCM

## 2022-09-03 ENCOUNTER — HEALTH MAINTENANCE LETTER (OUTPATIENT)
Age: 67
End: 2022-09-03

## 2022-11-04 ENCOUNTER — OFFICE VISIT (OUTPATIENT)
Dept: URGENT CARE | Facility: URGENT CARE | Age: 67
End: 2022-11-04
Payer: COMMERCIAL

## 2022-11-04 ENCOUNTER — NURSE TRIAGE (OUTPATIENT)
Dept: NURSING | Facility: CLINIC | Age: 67
End: 2022-11-04

## 2022-11-04 VITALS
DIASTOLIC BLOOD PRESSURE: 84 MMHG | TEMPERATURE: 98.3 F | OXYGEN SATURATION: 97 % | SYSTOLIC BLOOD PRESSURE: 118 MMHG | HEART RATE: 103 BPM

## 2022-11-04 DIAGNOSIS — J04.0 LARYNGITIS: Primary | ICD-10-CM

## 2022-11-04 LAB
DEPRECATED S PYO AG THROAT QL EIA: NEGATIVE
GROUP A STREP BY PCR: NOT DETECTED

## 2022-11-04 PROCEDURE — 99213 OFFICE O/P EST LOW 20 MIN: CPT | Performed by: EMERGENCY MEDICINE

## 2022-11-04 PROCEDURE — 87651 STREP A DNA AMP PROBE: CPT | Performed by: EMERGENCY MEDICINE

## 2022-11-04 RX ORDER — DEXAMETHASONE SODIUM PHOSPHATE 4 MG/ML
10 VIAL (ML) INJECTION ONCE
Status: DISCONTINUED | OUTPATIENT
Start: 2022-11-04 | End: 2022-11-04

## 2022-11-04 RX ORDER — DEXAMETHASONE SODIUM PHOSPHATE 10 MG/ML
10 INJECTION INTRAMUSCULAR; INTRAVENOUS ONCE
Status: COMPLETED | OUTPATIENT
Start: 2022-11-04 | End: 2022-11-04

## 2022-11-04 RX ADMIN — DEXAMETHASONE SODIUM PHOSPHATE 10 MG: 10 INJECTION INTRAMUSCULAR; INTRAVENOUS at 10:59

## 2022-11-04 NOTE — PROGRESS NOTES
Assessment & Plan     Diagnosis:    (J04.0) Laryngitis  (primary encounter diagnosis)      Medical Decision Making:  Michele Queen is a 67 year old male presented with sore throat and clinical evidence of pharyngitis and laryngitis.  The rapid strep test is negative, and formal culture has been set up in the lab. There is no clinical evidence of  peritonsillar abscess, retropharyngeal abscess, epiglottis, or Andrzej's angina. The etiology is most likely viral.  The patient's symptoms are consistent with viral laryngitis. Given odynophagia patient was given Decadron here orally. He is tolerating PO intake at this time.  I have recommended treatment with analgesics, and we will await formal culture results.  If the culture is positive, a provider will call the patient to initiate anti-microbial therapy. Go to the ER if increasing pain, change in voice, neck pain, vomiting, fever, or shortness of breath. Follow-up with primary physician if not improving in 3-5 days. All questions answered. Patient verbalized understanding and agreement with the plan.      Mann Whitehead PA-C  [facility]    Subjective     Michele Queen is a 67 year old male who presents to clinic today for the following health issues:  Chief Complaint   Patient presents with     Urgent Care     Pt has had a sore throat for the last week, every once in a while theres a coughing fit, a little congestion, took 3 Covid tests at home//negative        HPI    Onset of symptoms was 4 day(s) ago.  Course of illness is worsening.    Severity: moderate  Current and Associated symptoms: runny nose, cough - non-productive, sore throat, hoarse voice, facial pain/pressure and fatigue  Denies fever, wheezing, shortness of breath, nausea, vomiting, diarrhea, not eating, not sleeping well and taking in fluids?  Yes. Able to eat as well; it just hurts.  Treatment measures tried include Tylenol/Ibuprofen and OTC Cough med  Predisposing factors include  None    No reported respiratory concerns, vomiting or difficulties swallowing food/fluids at this time. Patient is tolerating drinking liquids currently.    Review of Systems    See HPI    Objective      Vitals: /84 (BP Location: Right arm, Patient Position: Sitting, Cuff Size: Adult Large)   Pulse 103   Temp 98.3  F (36.8  C) (Oral)   SpO2 97%   Resp: 16    Patient Vitals for the past 24 hrs:   BP Temp Temp src Pulse SpO2   11/04/22 1048 118/84 98.3  F (36.8  C) Oral 103 97 %       Vital signs reviewed by: Mann Whitehead PA-C    Physical Exam   Constitutional: Alert and active. Non-toxic appearing.  No acute distress.  HENT:   Right Ear: External ear normal. TM: gray/pale appearing  Left Ear: External ear normal. TM: gray/pale appearing.  Nose: Nose normal.    Eyes: Conjunctivae, EOM and lids are normal.   Mouth: Mucous membranes are moist. Posterior oropharynx is erythematous. Exudates not present. Tonsils are symmetrically enlarged. Uvula is midline. No submandibular swelling or erythema.  Neck: Normal ROM. No meningismus. No anterior or posterior chain cervical lymphadenopathy noted.  Cardiovascular: Regular rate and rhythm  Pulmonary/Chest: Effort normal. No respiratory distress. Lungs are clear to auscultation throughout.  GI: Abdomen is soft and non-tender throughout. No hepatosplenomegaly.  Musculoskeletal: Normal range of motion.   Neurological: Alert and appropriately oriented for age.   Skin: No rash noted on visualized skin.       Labs/Imaging:  Results for orders placed or performed in visit on 11/04/22   Streptococcus A Rapid Screen w/Reflex to PCR - Clinic Collect     Status: Normal    Specimen: Throat; Swab   Result Value Ref Range    Group A Strep antigen Negative Negative       Interventions:    Decadron 10mg PO    Mann Whitehead PA-C, November 4, 2022

## 2022-11-04 NOTE — TELEPHONE ENCOUNTER
"Pt called in regarding \"\" concerns.  He stated that he went to [facility] 3 hours ago & received PO Decadron.  Pt stated that the nurse at [facility] mentioned that the Decadron would taste bitter, however, Pt stated that he did not taste a bitter flavor (he reported he was given apple juice w/ it)  and that he hasn't felt any improvement of his symptoms yet.  Pt is questioning if he even got Decadron due to the lack of bitter taste & effectiveness.    Writer looked into Pt's chart & reassured pt that he was given Decadron from what was documented.  Also, writer educated Pt that some medications affect each person differently & it may take up to 12 to 24 hours before med make take into effect.      Pt seemed upset w/ writer's response & stated \"I should've driven up there to talk to them.\"  Writer was going to refer pt to the patient relations support line, however, Pt abruptly hung up on writer      Yulisa Mendez RN on 11/4/2022 at 2:16 PM      Reason for Disposition    Caller hangs up    Protocols used: DIFFICULT CALLER-A-AH      "

## 2022-11-08 ASSESSMENT — ENCOUNTER SYMPTOMS
MYALGIAS: 0
PARESTHESIAS: 0
SHORTNESS OF BREATH: 0
PALPITATIONS: 0
HEMATOCHEZIA: 0
FEVER: 0
SORE THROAT: 0
HEMATURIA: 0
COUGH: 0
CHILLS: 0
NERVOUS/ANXIOUS: 0
DIZZINESS: 0
NAUSEA: 0
FREQUENCY: 0
HEADACHES: 0
ABDOMINAL PAIN: 0
WEAKNESS: 0
DYSURIA: 0
DIARRHEA: 0
EYE PAIN: 0
ARTHRALGIAS: 0
CONSTIPATION: 0
HEARTBURN: 0
JOINT SWELLING: 0

## 2022-11-08 ASSESSMENT — ACTIVITIES OF DAILY LIVING (ADL): CURRENT_FUNCTION: NO ASSISTANCE NEEDED

## 2022-11-08 NOTE — PROGRESS NOTES
"SUBJECTIVE:   Jacob is a 67 year old who presents for Preventive Visit.    Patient has been advised of split billing requirements and indicates understanding: Yes     Are you in the first 12 months of your Medicare coverage?  No    Healthy Habits:     In general, how would you rate your overall health?  Excellent    Frequency of exercise:  4-5 days/week    Duration of exercise:  30-45 minutes    Do you usually eat at least 4 servings of fruit and vegetables a day, include whole grains    & fiber and avoid regularly eating high fat or \"junk\" foods?  Yes    Taking medications regularly:  Yes    Medication side effects:  None    Ability to successfully perform activities of daily living:  No assistance needed    Home Safety:  No safety concerns identified    Hearing Impairment:  Difficulty following a conversation in a noisy restaurant or crowded room    In the past 6 months, have you been bothered by leaking of urine?  No    In general, how would you rate your overall mental or emotional health?  Excellent      PHQ-2 Total Score: 0    Additional concerns today:  No    # Health Maintenance  - BP:   BP Readings from Last 3 Encounters:   11/09/22 (!) 133/92   11/04/22 118/84   08/23/21 100/69   - Cholesterol: pending  Recent Labs   Lab Test 08/23/21  0902   CHOL 184   HDL 70   *   TRIG 51   The 10-year ASCVD risk score (Miracle TOMAS, et al., 2019) is: 11.5%    Values used to calculate the score:      Age: 67 years      Sex: Male      Is Non- : No      Diabetic: No      Tobacco smoker: No      Systolic Blood Pressure: 133 mmHg      Is BP treated: No      HDL Cholesterol: 70 mg/dL      Total Cholesterol: 184 mg/dL  - Diabetes Screening: pending  - Lung Cancer Screening: not indicated  55-79yo w/30py smoking history and currently smoking OR quit within past 15 years:  Low dose CT annually and discontinued once a person has been 15 years tobacco free  - (+) seatbelt use, (+) helmet, (+) smoke " detector  - colon cancer screening: negative FIT test 2020    Do you feel safe in your environment? Yes    Have you ever done Advance Care Planning? (For example, a Health Directive, POLST, or a discussion with a medical provider or your loved ones about your wishes): Yes, patient states has an Advance Care Planning document and will bring a copy to the clinic.    Fall risk  Fallen 2 or more times in the past year?: No  Any fall with injury in the past year?: No    Cognitive Screening   1) Repeat 3 items (Leader, Season, Table)    2) Clock draw: NORMAL  3) 3 item recall: Recalls 3 objects  Results: 3 items recalled: COGNITIVE IMPAIRMENT LESS LIKELY    Mini-CogTM Copyright S Carter. Licensed by the author for use in [facility]; reprinted with permission ([email or web address]@[email or web address]). All rights reserved.      Do you have sleep apnea, excessive snoring or daytime drowsiness?: no    Reviewed and updated as needed this visit by clinical staff   Tobacco  Allergies  Meds  Problems  Med Hx  Surg Hx  Fam Hx          Reviewed and updated as needed this visit by Provider   Tobacco  Allergies  Meds  Problems  Med Hx  Surg Hx  Fam Hx         Social History     Tobacco Use     Smoking status: Former     Types: Cigarettes     Quit date: 2014     Years since quittin.3     Smokeless tobacco: Never   Substance Use Topics     Alcohol use: Yes     Comment: weekends     If you drink alcohol do you typically have >3 drinks per day or >7 drinks per week? No    Alcohol Use 2022   Prescreen: >3 drinks/day or >7 drinks/week? No   Prescreen: >3 drinks/day or >7 drinks/week? -       PROBLEMS TO ADD ON...  ED  - is finding 100mg of Viagra is less effective  - recently has had some episodes where he has been unable to finish  - tried Cialis in the past and wonders if we could try this again now vs referral to urology    Current providers sharing in care for this patient include:   Patient Care Team:  Toan Vitale  "MD BIBIANA as PCP - General (Family Medicine)  Toan Vitale MD as Assigned PCP    The following health maintenance items are reviewed in Epic and correct as of today:  Health Maintenance   Topic Date Due     HEPATITIS C SCREENING  Never done     ZOSTER IMMUNIZATION (1 of 2) Never done     LUNG CANCER SCREENING  Never done     COLORECTAL CANCER SCREENING  11/06/2020     Pneumococcal Vaccine: 65+ Years (1 - PCV) Never done     MEDICARE ANNUAL WELLNESS VISIT  08/23/2022     FALL RISK ASSESSMENT  08/23/2022     COVID-19 Vaccine (5 - Booster for Pfizer series) 10/04/2022     LIPID  08/23/2026     ADVANCE CARE PLANNING  08/23/2026     DTAP/TDAP/TD IMMUNIZATION (2 - Td or Tdap) 10/18/2032     PHQ-2 (once per calendar year)  Completed     INFLUENZA VACCINE  Completed     AORTIC ANEURYSM SCREENING (SYSTEM ASSIGNED)  Completed     IPV IMMUNIZATION  Aged Out     MENINGITIS IMMUNIZATION  Aged Out       Review of Systems   Constitutional: Negative for chills and fever.   HENT: Negative for congestion, ear pain, hearing loss and sore throat.    Eyes: Negative for pain and visual disturbance.   Respiratory: Negative for cough and shortness of breath.    Cardiovascular: Negative for chest pain, palpitations and peripheral edema.   Gastrointestinal: Negative for abdominal pain, constipation, diarrhea, heartburn, hematochezia and nausea.   Genitourinary: Positive for impotence. Negative for dysuria, frequency, genital sores, hematuria, penile discharge and urgency.   Musculoskeletal: Negative for arthralgias, joint swelling and myalgias.   Skin: Negative for rash.   Neurological: Negative for dizziness, weakness, headaches and paresthesias.   Psychiatric/Behavioral: Negative for mood changes. The patient is not nervous/anxious.        OBJECTIVE:   BP (!) 133/92 (BP Location: Right arm, Patient Position: Sitting, Cuff Size: Adult Regular)   Pulse 74   Temp 97.5  F (36.4  C) (Skin)   Resp 15   Ht 1.87 m (6' 1.62\")   Wt 79.4 kg " "(175 lb)   SpO2 99%   BMI 22.70 kg/m   Estimated body mass index is 22.78 kg/m  as calculated from the following:    Height as of 8/23/21: 1.86 m (6' 1.23\").    Weight as of 8/23/21: 78.8 kg (173 lb 12 oz).     Elevated blood pressure today as noted.     Physical Exam  GENERAL: healthy, alert and no distress  NECK: no adenopathy, no asymmetry, masses, or scars and thyroid normal to palpation  RESP: lungs clear to auscultation - no rales, rhonchi or wheezes  CV: regular rate and rhythm, normal S1 S2, no S3 or S4, no murmur, click or rub, no peripheral edema and peripheral pulses strong  ABDOMEN: soft, nontender, no hepatosplenomegaly, no masses and bowel sounds normal  MS: no gross musculoskeletal defects noted, no edema    Diagnostic Test Results:  Labs reviewed in Epic    ASSESSMENT / PLAN:   Michele was seen today for physical.    Diagnoses and all orders for this visit:    Lipid screening  -     Lipid Profile; Future  -     Lipid Profile    Screening for diabetes mellitus  -     Basic metabolic panel; Future  -     Basic metabolic panel    Screening for prostate cancer  -     PSA tumor marker; Future  -     PSA tumor marker    Other male erectile dysfunction  -     tadalafil (CIALIS) 20 MG tablet; Take 1 tablet (20 mg) by mouth daily as needed (erectile dysunction)    Elevated blood pressure reading without diagnosis of hypertension    Agreed to a trial for Cialis as ordered. Will monitor for efficacy and safety. Could consider urology referral if symptoms fail to improve.     Don will start checking AM pressures for the next few weeks and let me know what his numbers are. Could consider starting medication as indicated.    Patient has been advised of split billing requirements and indicates understanding: Yes      COUNSELING:  Reviewed preventive health counseling, as reflected in patient instructions    Estimated body mass index is 22.78 kg/m  as calculated from the following:    Height as of 8/23/21: 1.86 m " "(6' 1.23\").    Weight as of 8/23/21: 78.8 kg (173 lb 12 oz).        He reports that he quit smoking about 8 years ago. His smoking use included cigarettes. He has never used smokeless tobacco.      Appropriate preventive services were discussed with this patient, including applicable screening as appropriate for cardiovascular disease, diabetes, osteopenia/osteoporosis, and glaucoma.  As appropriate for age/gender, discussed screening for colorectal cancer, prostate cancer, breast cancer, and cervical cancer. Checklist reviewing preventive services available has been given to the patient.    Reviewed patients plan of care and provided an AVS. The Basic Care Plan (routine screening as documented in Health Maintenance) for Michele meets the Care Plan requirement. This Care Plan has been established and reviewed with the Patient.    Counseling Resources:  ATP IV Guidelines  Pooled Cohorts Equation Calculator  Breast Cancer Risk Calculator  Breast Cancer: Medication to Reduce Risk  FRAX Risk Assessment  ICSI Preventive Guidelines  Dietary Guidelines for Americans, 2010  USDA's MyPlate  ASA Prophylaxis  Lung CA Screening    Toan Vitale MD  [facility]      "

## 2022-11-09 ENCOUNTER — OFFICE VISIT (OUTPATIENT)
Dept: FAMILY MEDICINE | Facility: CLINIC | Age: 67
End: 2022-11-09
Payer: COMMERCIAL

## 2022-11-09 VITALS
HEIGHT: 74 IN | BODY MASS INDEX: 22.46 KG/M2 | OXYGEN SATURATION: 99 % | SYSTOLIC BLOOD PRESSURE: 146 MMHG | HEART RATE: 69 BPM | WEIGHT: 175 LBS | RESPIRATION RATE: 15 BRPM | DIASTOLIC BLOOD PRESSURE: 98 MMHG | TEMPERATURE: 97.5 F

## 2022-11-09 DIAGNOSIS — N52.8 OTHER MALE ERECTILE DYSFUNCTION: ICD-10-CM

## 2022-11-09 DIAGNOSIS — R03.0 ELEVATED BLOOD PRESSURE READING WITHOUT DIAGNOSIS OF HYPERTENSION: ICD-10-CM

## 2022-11-09 DIAGNOSIS — Z13.220 LIPID SCREENING: Primary | ICD-10-CM

## 2022-11-09 DIAGNOSIS — Z13.1 SCREENING FOR DIABETES MELLITUS: ICD-10-CM

## 2022-11-09 DIAGNOSIS — Z12.5 SCREENING FOR PROSTATE CANCER: ICD-10-CM

## 2022-11-09 LAB
ANION GAP SERPL CALCULATED.3IONS-SCNC: 12 MMOL/L (ref 7–15)
BUN SERPL-MCNC: 11.1 MG/DL (ref 8–23)
CALCIUM SERPL-MCNC: 9.4 MG/DL (ref 8.8–10.2)
CHLORIDE SERPL-SCNC: 95 MMOL/L (ref 98–107)
CHOLEST SERPL-MCNC: 169 MG/DL
CREAT SERPL-MCNC: 0.81 MG/DL (ref 0.67–1.17)
DEPRECATED HCO3 PLAS-SCNC: 27 MMOL/L (ref 22–29)
GFR SERPL CREATININE-BSD FRML MDRD: >90 ML/MIN/1.73M2
GLUCOSE SERPL-MCNC: 89 MG/DL (ref 70–99)
HDLC SERPL-MCNC: 54 MG/DL
LDLC SERPL CALC-MCNC: 101 MG/DL
NONHDLC SERPL-MCNC: 115 MG/DL
POTASSIUM SERPL-SCNC: 5.5 MMOL/L (ref 3.4–5.3)
PSA SERPL-MCNC: 0.24 NG/ML (ref 0–4.5)
SODIUM SERPL-SCNC: 134 MMOL/L (ref 136–145)
TRIGL SERPL-MCNC: 71 MG/DL

## 2022-11-09 RX ORDER — TADALAFIL 20 MG/1
20 TABLET ORAL DAILY PRN
Qty: 10 TABLET | Refills: 5 | Status: SHIPPED | OUTPATIENT
Start: 2022-11-09 | End: 2024-03-28

## 2022-11-09 NOTE — NURSING NOTE
"67 year old  Chief Complaint   Patient presents with     Physical       Blood pressure (!) 133/92, pulse 74, temperature 97.5  F (36.4  C), temperature source Skin, resp. rate 15, height 1.87 m (6' 1.62\"), weight 79.4 kg (175 lb), SpO2 99 %. Body mass index is 22.7 kg/m .  Patient Active Problem List   Diagnosis     High cholesterol     Psoriasis     Other male erectile dysfunction       Wt Readings from Last 2 Encounters:   22 79.4 kg (175 lb)   21 78.8 kg (173 lb 12 oz)     BP Readings from Last 3 Encounters:   22 (!) 133/92   22 118/84   21 100/69         Current Outpatient Medications   Medication     atorvastatin (LIPITOR) 10 MG tablet     sildenafil (VIAGRA) 50 MG tablet     tacrolimus (PROTOPIC) 0.1 % external ointment     No current facility-administered medications for this visit.       Social History     Tobacco Use     Smoking status: Former     Types: Cigarettes     Quit date: 2014     Years since quittin.3     Smokeless tobacco: Never   Substance Use Topics     Alcohol use: Yes     Comment: weekends     Drug use: Never       Health Maintenance Due   Topic Date Due     HEPATITIS C SCREENING  Never done     ZOSTER IMMUNIZATION (1 of 2) Never done     LUNG CANCER SCREENING  Never done     COLORECTAL CANCER SCREENING  2020     Pneumococcal Vaccine: 65+ Years (1 - PCV) Never done     MEDICARE ANNUAL WELLNESS VISIT  2022     COVID-19 Vaccine (5 - Booster for Pfizer series) 10/04/2022       No results found for: PAP      2022 8:12 AM    "

## 2022-11-17 DIAGNOSIS — E78.00 HIGH CHOLESTEROL: ICD-10-CM

## 2022-11-18 RX ORDER — ATORVASTATIN CALCIUM 10 MG/1
10 TABLET, FILM COATED ORAL DAILY
Qty: 90 TABLET | Refills: 3 | Status: SHIPPED | OUTPATIENT
Start: 2022-11-18 | End: 2023-11-27

## 2022-11-18 NOTE — TELEPHONE ENCOUNTER
Last visit 11/9/22, no future visit  Prescription approved per [employer] Refill Protocol.  Jennifer Escudero RN  [facility]

## 2023-01-10 ENCOUNTER — MYC MEDICAL ADVICE (OUTPATIENT)
Dept: FAMILY MEDICINE | Facility: CLINIC | Age: 68
End: 2023-01-10

## 2023-01-10 DIAGNOSIS — N52.8 OTHER MALE ERECTILE DYSFUNCTION: Primary | ICD-10-CM

## 2023-01-10 NOTE — TELEPHONE ENCOUNTER
Referral placed to urology for evaluation of ongoing erectile dysfunction that has not responded to trial of tadalafil. Pt aware of referral and provided contact number to schedule.    John Becker - HILARIO, RN  01/10/23 2:17 PM

## 2023-04-04 ENCOUNTER — VIRTUAL VISIT (OUTPATIENT)
Dept: UROLOGY | Facility: CLINIC | Age: 68
End: 2023-04-04
Attending: FAMILY MEDICINE
Payer: COMMERCIAL

## 2023-04-04 VITALS — HEIGHT: 73 IN | BODY MASS INDEX: 23.19 KG/M2 | WEIGHT: 175 LBS

## 2023-04-04 DIAGNOSIS — N52.8 OTHER MALE ERECTILE DYSFUNCTION: ICD-10-CM

## 2023-04-04 PROCEDURE — 99204 OFFICE O/P NEW MOD 45 MIN: CPT | Mod: VID | Performed by: PHYSICIAN ASSISTANT

## 2023-04-04 RX ORDER — TADALAFIL 5 MG/1
5 TABLET ORAL EVERY 24 HOURS
Qty: 90 TABLET | Refills: 4 | Status: SHIPPED | OUTPATIENT
Start: 2023-04-04 | End: 2024-03-28

## 2023-04-04 NOTE — PROGRESS NOTES
"Pt will meet you in [facility]      Jacob is a 67 year old who is being evaluated via a billable video visit.      How would you like to obtain your AVS? "[employer]  If the video visit is dropped, the invitation should be resent by: Text to cell phone: [phone]  Will anyone else be joining your video visit? No        Video-Visit Details    Type of service:  Video Visit   Video Start Time: 1:16 PM  Video End Time:1:31 PM    Originating Location (pt. Location): Home    Distant Location (provider location):  On-site  Platform used for Video Visit: [employer]    CC: ED    HPI:  Michele \"Don\" Bret is a pleasant  67 year old male who presents in consultation from Dr. Vitale for evaluation of the above. Has tried both tadalifil (20mg) and sildenafil (50-150mg) PRN and is having difficulty with maintaining an erection.  Has good libido, interested in his SO.      Risk factors: age, former smoker.    PSA 0.24 22.  Past Medical History:   Diagnosis Date     High cholesterol 2020     Other male erectile dysfunction 2020     Psoriasis 2020       Past Surgical History:   Procedure Laterality Date     EYE SURGERY         Social History     Socioeconomic History     Marital status:      Spouse name: Not on file     Number of children: Not on file     Years of education: Not on file     Highest education level: Not on file   Occupational History     Not on file   Tobacco Use     Smoking status: Former     Types: Cigarettes     Quit date: 2014     Years since quittin.7     Smokeless tobacco: Never   Vaping Use     Vaping status: Not on file   Substance and Sexual Activity     Alcohol use: Yes     Comment: weekends     Drug use: Never     Sexual activity: Yes     Partners: Female   Other Topics Concern     Not on file   Social History Narrative     Not on file     Social Determinants of Health     Financial Resource Strain: Not on file   Food Insecurity: Not on file   Transportation Needs: Not on file " "  Physical Activity: Not on file   Stress: Not on file   Social Connections: Not on file   Intimate Partner Violence: Not on file   Housing Stability: Not on file       Family History   Problem Relation Age of Onset     Throat cancer Mother      Lung Cancer Mother      Throat cancer Father      Breast Cancer Sister      Post-Traumatic Stress Disorder (PTSD) Brother      Alcoholism Brother        No Known Allergies    Current Outpatient Medications   Medication     atorvastatin (LIPITOR) 10 MG tablet     sildenafil (VIAGRA) 50 MG tablet     tacrolimus (PROTOPIC) 0.1 % external ointment     tadalafil (CIALIS) 20 MG tablet     No current facility-administered medications for this visit.         PEx:   Height 1.854 m (6' 1\"), weight 79.4 kg (175 lb).    PSYCH: NAD  EYES: EOMI  MOUTH: MMM  NEURO: AAO x3      A/P: Michele Queen is a 67 year old male with ED  --Discussed a variety of options including Cialis 5-10mg daily, ICI (penile injections with Tri-Mix), MUSE and IPP surgical consult. The risks and benefits of each were reviewed. He wishes to try Cialis 5-10mg daily. Can increase to 10mg at 7 days if room for improvement.   -If daily cialis is not effective, will need to consider Tri-Mix.   -He will update me with his progress via [employer].       Conchita Mistry PA-C  [facility]        26 minutes spent on the date of the encounter doing chart review, review of outside records, review of test results, interpretation of tests, patient visit and documentation                 "

## 2023-04-04 NOTE — LETTER
"2023       RE: Michele Queen  [address] [place] [address]     Dear Colleague,    Thank you for referring your patient, Michele Queen, to the [facility]. Please see a copy of my visit note below.    [facility]      Jacob is a 67 year old who is being evaluated via a billable video visit.      How would you like to obtain your AVS? [facility]  If the video visit is dropped, the invitation should be resent by: Text to cell phone: [phone]  Will anyone else be joining your video visit? No        Video-Visit Details    Type of service:  Video Visit   Video Start Time: 1:16 PM  Video End Time:1:31 PM    Originating Location (pt. Location): Home    Distant Location (provider location):  On-site  Platform used for Video Visit: [email or web address]    CC: ED    HPI:  Michele \"Jacob\" Bret is a pleasant  67 year old male who presents in consultation from Dr. Vitale for evaluation of the above. Has tried both tadalifil (20mg) and sildenafil (50-150mg) PRN and is having difficulty with maintaining an erection.  Has good libido, interested in his SO.      Risk factors: age, former smoker.    PSA 0.24 22.  Past Medical History:   Diagnosis Date    High cholesterol 2020    Other male erectile dysfunction 2020    Psoriasis 2020       Past Surgical History:   Procedure Laterality Date    EYE SURGERY         Social History     Socioeconomic History    Marital status:      Spouse name: Not on file    Number of children: Not on file    Years of education: Not on file    Highest education level: Not on file   Occupational History    Not on file   Tobacco Use    Smoking status: Former     Types: Cigarettes     Quit date: 2014     Years since quittin.7    Smokeless tobacco: Never   Vaping Use    Vaping status: Not on file   Substance and Sexual Activity    Alcohol use: Yes     Comment: weekends " "   Drug use: Never    Sexual activity: Yes     Partners: Female   Other Topics Concern    Not on file   Social History Narrative    Not on file     Social Determinants of Health     Financial Resource Strain: Not on file   Food Insecurity: Not on file   Transportation Needs: Not on file   Physical Activity: Not on file   Stress: Not on file   Social Connections: Not on file   Intimate Partner Violence: Not on file   Housing Stability: Not on file       Family History   Problem Relation Age of Onset    Throat cancer Mother     Lung Cancer Mother     Throat cancer Father     Breast Cancer Sister     Post-Traumatic Stress Disorder (PTSD) Brother     Alcoholism Brother        No Known Allergies    Current Outpatient Medications   Medication    atorvastatin (LIPITOR) 10 MG tablet    sildenafil (VIAGRA) 50 MG tablet    tacrolimus (PROTOPIC) 0.1 % external ointment    tadalafil (CIALIS) 20 MG tablet     No current facility-administered medications for this visit.         PEx:   Height 1.854 m (6' 1\"), weight 79.4 kg (175 lb).    PSYCH: NAD  EYES: EOMI  MOUTH: MMM  NEURO: AAO x3      A/P: Michele Queen is a 67 year old male with ED  --Discussed a variety of options including Cialis 5-10mg daily, ICI (penile injections with Tri-Mix), MUSE and IPP surgical consult. The risks and benefits of each were reviewed. He wishes to try Cialis 5-10mg daily. Can increase to 10mg at 7 days if room for improvement.   -If daily cialis is not effective, will need to consider Tri-Mix.   -He will update me with his progress via [employer].       Conchita Mistry PA-C  [facility]        26 minutes spent on the date of the encounter doing chart review, review of outside records, review of test results, interpretation of tests, patient visit and documentation       "

## 2023-07-28 DIAGNOSIS — N52.8 OTHER MALE ERECTILE DYSFUNCTION: Primary | ICD-10-CM

## 2023-07-28 RX ORDER — TADALAFIL 10 MG/1
10 TABLET ORAL DAILY
Qty: 90 TABLET | Refills: 4 | Status: SHIPPED | OUTPATIENT
Start: 2023-07-28 | End: 2023-12-01

## 2023-08-09 ENCOUNTER — TELEPHONE (OUTPATIENT)
Dept: FAMILY MEDICINE | Facility: CLINIC | Age: 68
End: 2023-08-09

## 2023-08-09 DIAGNOSIS — M25.521 RIGHT ELBOW PAIN: Primary | ICD-10-CM

## 2023-08-09 NOTE — TELEPHONE ENCOUNTER
Who is calling? [facility]  What do they need? PT referral   Is this an insurance referral? No  Does caller need a call back? Yes   Additional Comments:   [facility]  P: [phone]  F: [phone]

## 2023-08-09 NOTE — TELEPHONE ENCOUNTER
Message left for Don to return call to clinic to discuss his request for physical therapy.  TONYA FaithN, RN, CCM  RN Care Coordinator  [facility]  08/09/23  10:24 AM  Phone: [phone]

## 2023-08-09 NOTE — TELEPHONE ENCOUNTER
Spoke with Jacob who reports he has right elbow pain which feels a lot like tennis elbow.  He is requesting a physical therapy referral.  Referral faxed to [facility] at Fax:  [phone].  TONYA FaithN, RN, [facility]  RN Care Coordinator  [facility]  08/09/23  3:47 PM  Phone: [phone]

## 2023-09-12 DIAGNOSIS — L40.9 PSORIASIS: ICD-10-CM

## 2023-09-13 NOTE — TELEPHONE ENCOUNTER
Medication requested: tacrolimus (PROTOPIC) 0.1 % external ointment   Last office visit: 11/9/22  [facility] appointments: non  Medication last refilled: 7/14/22; 30 g + 0 refills  Last qualifying labs: N/A    Routing refill request to provider for review/approval because:  Drug not on the [facility] refill protocol     John RICH, RN  09/13/23 4:05 PM

## 2023-09-14 RX ORDER — TACROLIMUS 1 MG/G
OINTMENT TOPICAL 2 TIMES DAILY
Qty: 30 G | Refills: 0 | Status: SHIPPED | OUTPATIENT
Start: 2023-09-14

## 2023-09-14 NOTE — TELEPHONE ENCOUNTER
Med refilled as requested.     Michele was seen today for refill request.    Diagnoses and all orders for this visit:    Psoriasis  -     tacrolimus (PROTOPIC) 0.1 % external ointment; Apply topically 2 times daily As needed, to affected skin, using small amount.      Toan Vitale MD  5:47 PM, September 14, 2023

## 2023-11-27 DIAGNOSIS — E78.00 HIGH CHOLESTEROL: ICD-10-CM

## 2023-11-28 RX ORDER — ATORVASTATIN CALCIUM 10 MG/1
10 TABLET, FILM COATED ORAL DAILY
Qty: 90 TABLET | Refills: 0 | Status: SHIPPED | OUTPATIENT
Start: 2023-11-28 | End: 2024-03-28

## 2023-11-28 NOTE — TELEPHONE ENCOUNTER
Medication requested: atorvastatin (LIPITOR) 10 MG tablet   Last office visit: 11/09/2022  Future [facility] appointments: none  Medication last refilled: 11/18/2022; 90 + 3 refills  Last qualifying labs:     Component      Latest Ref Rng 11/9/2022  8:32 AM   LDL Cholesterol Calculated      <=100 mg/dL 101 (H)       Medication is being filled for 1 time refill only due to:  Patient needs labs lipid panel. Future labs ordered lipid panel. Patient needs to be seen because it has been more than one year since last visit.  MC message sent to pt.    TONYA CarreonN, RN  11/28/23, 3:37 PM

## 2023-11-29 ASSESSMENT — ENCOUNTER SYMPTOMS
FREQUENCY: 0
CONSTIPATION: 0
DYSURIA: 0
EYE PAIN: 0
HEMATURIA: 0
ARTHRALGIAS: 0
NAUSEA: 0
FEVER: 0
SORE THROAT: 0
NERVOUS/ANXIOUS: 0
DIZZINESS: 0
WEAKNESS: 0
PALPITATIONS: 0
MYALGIAS: 0
SHORTNESS OF BREATH: 0
PARESTHESIAS: 0
ABDOMINAL PAIN: 0
HEADACHES: 0
CHILLS: 0
JOINT SWELLING: 0
COUGH: 0
HEMATOCHEZIA: 0
HEARTBURN: 0
DIARRHEA: 0

## 2023-11-29 ASSESSMENT — ACTIVITIES OF DAILY LIVING (ADL): CURRENT_FUNCTION: NO ASSISTANCE NEEDED

## 2023-12-01 ENCOUNTER — OFFICE VISIT (OUTPATIENT)
Dept: FAMILY MEDICINE | Facility: CLINIC | Age: 68
End: 2023-12-01
Payer: COMMERCIAL

## 2023-12-01 VITALS
SYSTOLIC BLOOD PRESSURE: 151 MMHG | DIASTOLIC BLOOD PRESSURE: 87 MMHG | BODY MASS INDEX: 23.39 KG/M2 | HEART RATE: 77 BPM | TEMPERATURE: 98.3 F | WEIGHT: 176.5 LBS | HEIGHT: 73 IN | OXYGEN SATURATION: 100 %

## 2023-12-01 DIAGNOSIS — Z71.84 TRAVEL ADVICE ENCOUNTER: Primary | ICD-10-CM

## 2023-12-01 DIAGNOSIS — N52.8 OTHER MALE ERECTILE DYSFUNCTION: ICD-10-CM

## 2023-12-01 RX ORDER — AZITHROMYCIN 500 MG/1
1000 TABLET, FILM COATED ORAL DAILY
Qty: 4 TABLET | Refills: 0 | Status: SHIPPED | OUTPATIENT
Start: 2023-12-01 | End: 2024-04-09

## 2023-12-01 RX ORDER — TADALAFIL 10 MG/1
10 TABLET ORAL DAILY
Qty: 90 TABLET | Refills: 4 | Status: SHIPPED | OUTPATIENT
Start: 2023-12-01

## 2023-12-01 RX ORDER — ONDANSETRON 4 MG/1
4 TABLET, ORALLY DISINTEGRATING ORAL EVERY 8 HOURS PRN
Qty: 12 TABLET | Refills: 0 | Status: SHIPPED | OUTPATIENT
Start: 2023-12-01 | End: 2024-04-09

## 2023-12-01 NOTE — NURSING NOTE
"Don  68 year old    Chief Complaint   Patient presents with    Wellness Visit    Patient Request     EKG - Travelling program he is part of requires 60+ participants to have an EKG  Needs 90-day supply of tadalafil 10mg, would like PCP to take this Rx over    Forms     For the travelling program - for pt and his wife            Blood pressure (!) 151/87, pulse 77, temperature 98.3  F (36.8  C), temperature source Skin, height 1.854 m (6' 1\"), weight 80.1 kg (176 lb 8 oz), SpO2 100%. Body mass index is 23.29 kg/m .    Patient Active Problem List   Diagnosis    High cholesterol    Psoriasis    Other male erectile dysfunction              Wt Readings from Last 2 Encounters:   23 80.1 kg (176 lb 8 oz)   23 79.4 kg (175 lb)       BP Readings from Last 3 Encounters:   23 (!) 151/87   22 (!) 146/98   22 118/84                Current Outpatient Medications   Medication    atorvastatin (LIPITOR) 10 MG tablet    tacrolimus (PROTOPIC) 0.1 % external ointment    tadalafil (CIALIS) 10 MG tablet    sildenafil (VIAGRA) 50 MG tablet    tadalafil (CIALIS) 20 MG tablet    tadalafil (CIALIS) 5 MG tablet     No current facility-administered medications for this visit.              Social History     Tobacco Use    Smoking status: Former     Types: Cigarettes     Quit date: 2014     Years since quittin.3    Smokeless tobacco: Never   Substance Use Topics    Alcohol use: Yes     Alcohol/week: 5.0 - 6.0 standard drinks of alcohol     Types: 5 - 6 Standard drinks or equivalent per week     Comment: weekends    Drug use: Yes     Types: Marijuana     Comment: Occasionally              Health Maintenance Due   Topic Date Due    HEPATITIS C SCREENING  Never done    ZOSTER IMMUNIZATION (1 of 2) Never done    LUNG CANCER SCREENING  Never done    RSV VACCINE (Pregnancy & 60+) (1 - 1-dose 60+ series) Never done    COLORECTAL CANCER SCREENING  2020    Pneumococcal Vaccine: 65+ Years (1 - PCV) Never done " "   MEDICARE ANNUAL WELLNESS VISIT  11/09/2023            No results found for: \"PAP\"           December 1, 2023 1:18 PM    "

## 2023-12-01 NOTE — PROGRESS NOTES
"  Assessment & Plan   Problem List Items Addressed This Visit          Other    Other male erectile dysfunction    Relevant Medications    tadalafil (CIALIS) 10 MG tablet     Other Visit Diagnoses       Travel advice encounter    -  Primary    Relevant Medications    azithromycin (ZITHROMAX) 500 MG tablet    ondansetron (ZOFRAN ODT) 4 MG ODT tab    Other Relevant Orders    EKG 12-lead complete w/read - Clinics (Completed)           Reviewed recommendations from travel group. No need for immunization in clinic today, but will proceed with EKG as requested and medication filled as noted above.     36 minutes spent on the date of the encounter doing chart review, history and exam, documentation and further activities as noted.      MD ANNABELLA Figueroa [facility]    Christian James is a 68 year old, presenting for the following health issues:  Wellness Visit, Patient Request (EKG - Travelling program he is part of requires 60+ participants to have an EKG/Needs 90-day supply of tadalafil 10mg, would like PCP to take this Rx over), and Forms (For the travelling program - for pt and his wife)      HPI   Travel counseling  - Going traveling to multiple locations. Traveling with a group that has provided recommendations in terms of vaccinations and medications  - is scheduled to receive Typhoid, Hep A and B, Yellow Fever  - has been prescribed malaria prevention medication  - was prescribed 3 scopolamine patches for the trip, wonders if there are other treatment options should he go through the scopoloamine, advised dramamine \"less drowsy\" formula  - requests Rx for traveler's diarrhea  - needs EKG performed today per group requirements, no previously reported cardiac issues        Review of Systems   Constitutional, HEENT, cardiovascular, pulmonary, gi and gu systems are negative, except as otherwise noted.      Objective    BP (!) 151/87 (BP Location: Left arm, Patient Position: Sitting, Cuff Size: " "Adult Regular)   Pulse 77   Temp 98.3  F (36.8  C) (Skin)   Ht 1.854 m (6' 1\")   Wt 80.1 kg (176 lb 8 oz)   SpO2 100%   BMI 23.29 kg/m    Body mass index is 23.29 kg/m .  Physical Exam   GENERAL: healthy, alert and no distress  NECK: no adenopathy, no asymmetry, masses, or scars and thyroid normal to palpation  RESP: lungs clear to auscultation - no rales, rhonchi or wheezes  CV: regular rate and rhythm, normal S1 S2, no S3 or S4, no murmur, click or rub, no peripheral edema and peripheral pulses strong  ABDOMEN: soft, nontender, no hepatosplenomegaly, no masses and bowel sounds normal  MS: no gross musculoskeletal defects noted, no edema    EKG Interpretation:     Interpreted by Toan Vitale MD  Time reviewed:14:21    Symptoms at time of EKG: None   Rhythm: Normal sinus   Rate: Normal  Axis: Normal  Ectopy: None  Conduction: Normal and RSR (V1): non-diagnostic  ST Segments/ T Waves: No ST-T wave changes and No acute ischemic changes  Q Waves: None  Comparison to prior: No old EKG available    Clinical Impression: normal EKG              "

## 2023-12-09 ENCOUNTER — HEALTH MAINTENANCE LETTER (OUTPATIENT)
Age: 68
End: 2023-12-09

## 2023-12-13 ENCOUNTER — DOCUMENTATION ONLY (OUTPATIENT)
Dept: OTHER | Facility: CLINIC | Age: 68
End: 2023-12-13
Payer: COMMERCIAL

## 2024-03-26 ENCOUNTER — MYC MEDICAL ADVICE (OUTPATIENT)
Dept: FAMILY MEDICINE | Facility: CLINIC | Age: 69
End: 2024-03-26

## 2024-03-26 DIAGNOSIS — Z01.00 ENCOUNTER FOR VISION SCREENING: ICD-10-CM

## 2024-03-26 DIAGNOSIS — H91.90 DECREASED HEARING, UNSPECIFIED LATERALITY: Primary | ICD-10-CM

## 2024-03-26 NOTE — TELEPHONE ENCOUNTER
Pt requesting audiology and ophthalmology referrals for himself and his wife. Placing referrals per Dr. Reyes. Pt reports their daughter has commented on their hearing.    TONYA CarreonN, RN  03/26/24, 3:12 PM

## 2024-03-28 DIAGNOSIS — E78.00 HIGH CHOLESTEROL: ICD-10-CM

## 2024-03-28 RX ORDER — ATORVASTATIN CALCIUM 10 MG/1
10 TABLET, FILM COATED ORAL DAILY
Qty: 30 TABLET | Refills: 0 | Status: SHIPPED | OUTPATIENT
Start: 2024-03-28 | End: 2024-04-09

## 2024-03-28 NOTE — TELEPHONE ENCOUNTER
Atorvastatin (Lipitor) 10 mg    Last Office Visit: 12/1/23  Future [facility] Appointments: None  Medication last refilled: 11/28/23 #90 with 0 refill(s)    Required labs per protocol:    LAB REF RANGE 8/23/21 11/9/22   LDL < 100 mg/dL 104 High 101 High     Medication is being filled for 1 time refill only due to:  Patient needs labs Lipid Panel.    [employer] message sent to patient to call and schedule appointment.    Kenya White, TONYAN, RN, CCM

## 2024-04-01 ENCOUNTER — OFFICE VISIT (OUTPATIENT)
Dept: OPHTHALMOLOGY | Facility: CLINIC | Age: 69
End: 2024-04-01
Attending: OPTOMETRIST
Payer: COMMERCIAL

## 2024-04-01 DIAGNOSIS — H01.01A ULCERATIVE BLEPHARITIS OF UPPER AND LOWER EYELIDS OF BOTH EYES: Primary | ICD-10-CM

## 2024-04-01 DIAGNOSIS — H25.13 NUCLEAR SENILE CATARACT OF BOTH EYES: ICD-10-CM

## 2024-04-01 DIAGNOSIS — H04.123 DRY EYE SYNDROME OF BOTH EYES: ICD-10-CM

## 2024-04-01 DIAGNOSIS — H02.831 DERMATOCHALASIS OF BOTH UPPER EYELIDS: ICD-10-CM

## 2024-04-01 DIAGNOSIS — H01.01B ULCERATIVE BLEPHARITIS OF UPPER AND LOWER EYELIDS OF BOTH EYES: Primary | ICD-10-CM

## 2024-04-01 DIAGNOSIS — H02.834 DERMATOCHALASIS OF BOTH UPPER EYELIDS: ICD-10-CM

## 2024-04-01 DIAGNOSIS — Z01.00 ENCOUNTER FOR VISION SCREENING: ICD-10-CM

## 2024-04-01 PROCEDURE — G0463 HOSPITAL OUTPT CLINIC VISIT: HCPCS | Performed by: OPTOMETRIST

## 2024-04-01 PROCEDURE — 92004 COMPRE OPH EXAM NEW PT 1/>: CPT | Performed by: OPTOMETRIST

## 2024-04-01 RX ORDER — CARBOXYMETHYLCELLULOSE SODIUM 5 MG/ML
1 SOLUTION/ DROPS OPHTHALMIC 4 TIMES DAILY
Qty: 400 EACH | Refills: 11 | Status: SHIPPED | OUTPATIENT
Start: 2024-04-01

## 2024-04-01 RX ORDER — EYELID CLEANSER COMBINATION 13
1 PADS, MEDICATED (EA) TOPICAL 2 TIMES DAILY
Qty: 60 EACH | Refills: 11 | Status: SHIPPED | OUTPATIENT
Start: 2024-04-01

## 2024-04-01 ASSESSMENT — REFRACTION_MANIFEST
OD_AXIS: 012
OS_SPHERE: +3.50
OS_CYLINDER: +0.75
OS_ADD: +2.75
OD_ADD: +2.75
OD_SPHERE: +2.75
OS_AXIS: 180
OD_CYLINDER: +1.00

## 2024-04-01 ASSESSMENT — CONF VISUAL FIELD
OS_SUPERIOR_TEMPORAL_RESTRICTION: 0
OD_NORMAL: 1
METHOD: COUNTING FINGERS
OS_INFERIOR_NASAL_RESTRICTION: 0
OS_INFERIOR_TEMPORAL_RESTRICTION: 0
OD_SUPERIOR_TEMPORAL_RESTRICTION: 0
OD_SUPERIOR_NASAL_RESTRICTION: 0
OS_NORMAL: 1
OD_INFERIOR_NASAL_RESTRICTION: 0
OD_INFERIOR_TEMPORAL_RESTRICTION: 0
OS_SUPERIOR_NASAL_RESTRICTION: 0

## 2024-04-01 ASSESSMENT — VISUAL ACUITY
CORRECTION_TYPE: GLASSES
OD_CC: 20/20
METHOD: SNELLEN - LINEAR
OS_CC: 20/20

## 2024-04-01 ASSESSMENT — EXTERNAL EXAM - RIGHT EYE: OD_EXAM: NORMAL

## 2024-04-01 ASSESSMENT — REFRACTION_WEARINGRX
OS_ADD: +2.75
OD_CYLINDER: +1.00
OS_CYLINDER: +0.75
SPECS_TYPE: BIFOCAL
OD_AXIS: 026
OS_SPHERE: +3.25
OD_SPHERE: +2.50
OS_AXIS: 007
OD_ADD: +2.75

## 2024-04-01 ASSESSMENT — TONOMETRY
IOP_METHOD: TONOPEN
OS_IOP_MMHG: 20
OD_IOP_MMHG: 21

## 2024-04-01 ASSESSMENT — EXTERNAL EXAM - LEFT EYE: OS_EXAM: NORMAL

## 2024-04-01 NOTE — NURSING NOTE
Chief Complaints and History of Present Illnesses   Patient presents with    Annual Eye Exam     Eye fatigue      Chief Complaint(s) and History of Present Illness(es)       Annual Eye Exam              Comments: Eye fatigue               Comments    Pt states his eye get very tired after reading for only 10 minutes   States occ floaters , not new   No flashes, eye pain or redness    Karol LOPEZ 11:42 AM April 1, 2024

## 2024-04-01 NOTE — PROGRESS NOTES
HPI:  Patient of eye fatigue after reading for 10 minutes.     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       Pertinent Medical History:  Elevated blood pressure without diagnosis of hypertension.   High cholesterol    Ocular History:  Hyperopia, both eyes.   Cataracts, both eyes.     Eye Medications:  None    Assessment and Plan:    #   Hyperopia, both eyes.   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   Could consider prisms for CI - can follow up with Dr. Narvaez/orthoptist. After discussion, patient wants to hold off on prisms for now.   Could be lined bi-focal with lower segment height causing fatigue. Can try single vision reading glasses. Recommend digital technology to measure optic center/pupillary distance.   Glasses prescription given.     #   Dermatochalasis, both upper lids.   Visually significant. Not too bothered - monitor for now.     #   Cataract, both eyes.   Not visually significant.   Recommend UV protection.   Recommend annual dilated eye exam.      #   Blepharitis, both eyes.   Ocusoft lid wipes, 2 times daily, both eyes.     #   Meibomian Gland Dysfunction, both eyes.   Symptoms of dry eyes include blurry vision, eye pain, grittiness, burning, redness, eye irritation, and tearing. The goal is not to eliminate, but to improve symptoms.   Preservative free artificial tears 4 times daily both eyes. Refresh or Systane.   Warm compress (with dry eye mask), 10 minutes, at bedtime, both eyes.              Patient consented to a dilated eye exam:    Yes. Side effects discussed.  Ease of exam: mild difficulty due to eyelid squeezing.     Medical History:  Past Medical History:   Diagnosis Date    High cholesterol 9/1/2020    Other male erectile dysfunction 9/1/2020    Psoriasis 9/1/2020       Medications:  Current Outpatient Medications   Medication Sig Dispense Refill    atorvastatin (LIPITOR) 10 MG tablet Take 1 tablet (10 mg) by mouth daily 30 tablet 0    azithromycin  (ZITHROMAX) 500 MG tablet Take 2 tablets (1,000 mg) by mouth daily 4 tablet 0    ondansetron (ZOFRAN ODT) 4 MG ODT tab Take 1 tablet (4 mg) by mouth every 8 hours as needed for nausea 12 tablet 0    tacrolimus (PROTOPIC) 0.1 % external ointment Apply topically 2 times daily As needed, to affected skin, using small amount. 30 g 0    tadalafil (CIALIS) 10 MG tablet Take 1 tablet (10 mg) by mouth daily 90 tablet 4   Complete documentation of historical and exam elements from today's encounter can be found in the full encounter summary report (not reduplicated in this progress note). I personally obtained the chief complaint(s) and history of present illness.  I confirmed and edited as necessary the review of systems, past medical/surgical history, family history, social history, and examination findings as documented by others; and I examined the patient myself. I personally reviewed the relevant tests, images, and reports as documented above. I formulated and edited as necessary the assessment and plan and discussed the findings and management plan with the patient and family. - Eugenia Medel OD

## 2024-04-03 SDOH — HEALTH STABILITY: PHYSICAL HEALTH: ON AVERAGE, HOW MANY MINUTES DO YOU ENGAGE IN EXERCISE AT THIS LEVEL?: 40 MIN

## 2024-04-03 SDOH — HEALTH STABILITY: PHYSICAL HEALTH: ON AVERAGE, HOW MANY DAYS PER WEEK DO YOU ENGAGE IN MODERATE TO STRENUOUS EXERCISE (LIKE A BRISK WALK)?: 6 DAYS

## 2024-04-03 ASSESSMENT — SOCIAL DETERMINANTS OF HEALTH (SDOH): HOW OFTEN DO YOU GET TOGETHER WITH FRIENDS OR RELATIVES?: MORE THAN THREE TIMES A WEEK

## 2024-04-09 ENCOUNTER — ORDERS ONLY (AUTO-RELEASED) (OUTPATIENT)
Dept: FAMILY MEDICINE | Facility: CLINIC | Age: 69
End: 2024-04-09

## 2024-04-09 ENCOUNTER — OFFICE VISIT (OUTPATIENT)
Dept: FAMILY MEDICINE | Facility: CLINIC | Age: 69
End: 2024-04-09
Payer: COMMERCIAL

## 2024-04-09 VITALS
SYSTOLIC BLOOD PRESSURE: 143 MMHG | HEART RATE: 73 BPM | HEIGHT: 73 IN | WEIGHT: 175.04 LBS | TEMPERATURE: 98 F | OXYGEN SATURATION: 99 % | BODY MASS INDEX: 23.2 KG/M2 | DIASTOLIC BLOOD PRESSURE: 83 MMHG

## 2024-04-09 DIAGNOSIS — Z12.11 SCREEN FOR COLON CANCER: ICD-10-CM

## 2024-04-09 DIAGNOSIS — Z13.220 LIPID SCREENING: ICD-10-CM

## 2024-04-09 DIAGNOSIS — Z12.5 SCREENING FOR PROSTATE CANCER: ICD-10-CM

## 2024-04-09 DIAGNOSIS — E78.00 HIGH CHOLESTEROL: ICD-10-CM

## 2024-04-09 DIAGNOSIS — Z13.228 SCREENING FOR METABOLIC DISORDER: ICD-10-CM

## 2024-04-09 DIAGNOSIS — Z00.00 WELLNESS EXAMINATION: Primary | ICD-10-CM

## 2024-04-09 PROCEDURE — 80061 LIPID PANEL: CPT | Mod: ORL | Performed by: FAMILY MEDICINE

## 2024-04-09 PROCEDURE — 84153 ASSAY OF PSA TOTAL: CPT | Mod: ORL | Performed by: FAMILY MEDICINE

## 2024-04-09 PROCEDURE — 82374 ASSAY BLOOD CARBON DIOXIDE: CPT | Mod: ORL | Performed by: FAMILY MEDICINE

## 2024-04-09 RX ORDER — ATORVASTATIN CALCIUM 10 MG/1
10 TABLET, FILM COATED ORAL DAILY
Qty: 30 TABLET | Refills: 0 | Status: SHIPPED | OUTPATIENT
Start: 2024-04-09 | End: 2024-04-09
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
## 2024-04-09 NOTE — NURSING NOTE
"68 year old  Chief Complaint   Patient presents with    Wellness Visit       Blood pressure (!) 149/63, pulse 70, temperature 98  F (36.7  C), height 1.853 m (6' 0.95\"), weight 79.4 kg (175 lb 0.6 oz), SpO2 99%. Body mass index is 23.12 kg/m .  Patient Active Problem List   Diagnosis    High cholesterol    Psoriasis    Other male erectile dysfunction       Wt Readings from Last 2 Encounters:   24 79.4 kg (175 lb 0.6 oz)   23 80.1 kg (176 lb 8 oz)     BP Readings from Last 3 Encounters:   24 (!) 149/63   23 (!) 151/87   22 (!) 146/98         Current Outpatient Medications   Medication Sig Dispense Refill    atorvastatin (LIPITOR) 10 MG tablet Take 1 tablet (10 mg) by mouth daily 30 tablet 0    carboxymethylcellulose PF (CARBOXYMETHYLCELLULOSE SODIUM) 0.5 % ophthalmic solution Place 1 drop into both eyes 4 times daily Preservative free artificial tears, single use vials. Aram 3/flaxseed. 400 each 11    Eyelid Cleansers (OCUSOFT EYELID CLEANSING) PADS Externally apply 1 Box topically 2 times daily 60 each 11    tacrolimus (PROTOPIC) 0.1 % external ointment Apply topically 2 times daily As needed, to affected skin, using small amount. 30 g 0    tadalafil (CIALIS) 10 MG tablet Take 1 tablet (10 mg) by mouth daily 90 tablet 4    azithromycin (ZITHROMAX) 500 MG tablet Take 2 tablets (1,000 mg) by mouth daily 4 tablet 0    ondansetron (ZOFRAN ODT) 4 MG ODT tab Take 1 tablet (4 mg) by mouth every 8 hours as needed for nausea 12 tablet 0     No current facility-administered medications for this visit.       Social History     Tobacco Use    Smoking status: Former     Types: Cigarettes     Quit date: 2014     Years since quittin.7    Smokeless tobacco: Never   Substance Use Topics    Alcohol use: Yes     Alcohol/week: 5.0 - 6.0 standard drinks of alcohol     Types: 5 - 6 Standard drinks or equivalent per week     Comment: weekends    Drug use: Yes     Types: Marijuana     Comment: " "Occasionally       Health Maintenance Due   Topic Date Due    HEPATITIS C SCREENING  Never done    LUNG CANCER SCREENING  Never done    RSV VACCINE (Pregnancy & 60+) (1 - 1-dose 60+ series) Never done    COLORECTAL CANCER SCREENING  11/06/2020    MEDICARE ANNUAL WELLNESS VISIT  11/09/2023    LIPID  11/09/2023    ZOSTER IMMUNIZATION (2 of 2) 02/07/2024       No results found for: \"PAP\"      April 9, 2024 3:26 PM    "

## 2024-04-10 LAB
ANION GAP SERPL CALCULATED.3IONS-SCNC: 11 MMOL/L (ref 7–15)
BUN SERPL-MCNC: 15 MG/DL (ref 8–23)
CALCIUM SERPL-MCNC: 9.6 MG/DL (ref 8.8–10.2)
CHLORIDE SERPL-SCNC: 101 MMOL/L (ref 98–107)
CHOLEST SERPL-MCNC: 205 MG/DL
CREAT SERPL-MCNC: 0.97 MG/DL (ref 0.67–1.17)
DEPRECATED HCO3 PLAS-SCNC: 26 MMOL/L (ref 22–29)
EGFRCR SERPLBLD CKD-EPI 2021: 86 ML/MIN/1.73M2
FASTING STATUS PATIENT QL REPORTED: NO
GLUCOSE SERPL-MCNC: 100 MG/DL (ref 70–99)
HDLC SERPL-MCNC: 78 MG/DL
LDLC SERPL CALC-MCNC: 114 MG/DL
NONHDLC SERPL-MCNC: 127 MG/DL
POTASSIUM SERPL-SCNC: 4.9 MMOL/L (ref 3.4–5.3)
PSA SERPL DL<=0.01 NG/ML-MCNC: 0.2 NG/ML (ref 0–4.5)
SODIUM SERPL-SCNC: 138 MMOL/L (ref 135–145)
TRIGL SERPL-MCNC: 66 MG/DL

## 2024-04-17 ENCOUNTER — TELEPHONE (OUTPATIENT)
Dept: FAMILY MEDICINE | Facility: CLINIC | Age: 69
End: 2024-04-17

## 2024-04-17 DIAGNOSIS — E78.00 HIGH CHOLESTEROL: ICD-10-CM

## 2024-04-17 RX ORDER — ATORVASTATIN CALCIUM 10 MG/1
10 TABLET, FILM COATED ORAL DAILY
Qty: 90 TABLET | Refills: 3 | Status: SHIPPED | OUTPATIENT
Start: 2024-04-17

## 2024-04-17 NOTE — TELEPHONE ENCOUNTER
Atorvastatin (Lipitor) 10 mg    Last Office Visit: 4/9/24  Future [facility] Appointments: None  Medication last refilled: 3/28/24 #30 with 0 refill(s)    Required labs per protocol:    LAB REF RANGE 11/9/22 4/9/24   LDL < 100 mg/dL 101 High 114 High     Prescription approved per [facility] Refill Protocol.    Kenya White, TONYAN, RN, CCM

## 2024-04-24 LAB — NONINV COLON CA DNA+OCC BLD SCRN STL QL: POSITIVE

## 2024-04-25 ENCOUNTER — MYC MEDICAL ADVICE (OUTPATIENT)
Dept: FAMILY MEDICINE | Facility: CLINIC | Age: 69
End: 2024-04-25

## 2024-04-25 DIAGNOSIS — Z12.11 SCREEN FOR COLON CANCER: Primary | ICD-10-CM

## 2024-04-29 NOTE — TELEPHONE ENCOUNTER
Positive cologuard test. Follow up colonoscopy as ordered.     Diagnoses and all orders for this visit:    Screen for colon cancer  -     Colonoscopy Screening  Referral; Future      Toan Vitale MD  12:52 PM, April 29, 2024

## 2024-05-01 ENCOUNTER — TRANSFERRED RECORDS (OUTPATIENT)
Dept: HEALTH INFORMATION MANAGEMENT | Facility: CLINIC | Age: 69
End: 2024-05-01
Payer: COMMERCIAL

## 2024-05-02 ENCOUNTER — MYC MEDICAL ADVICE (OUTPATIENT)
Dept: FAMILY MEDICINE | Facility: CLINIC | Age: 69
End: 2024-05-02

## 2024-05-03 ENCOUNTER — TELEPHONE (OUTPATIENT)
Dept: GASTROENTEROLOGY | Facility: CLINIC | Age: 69
End: 2024-05-03
Payer: COMMERCIAL

## 2024-05-04 ENCOUNTER — TELEPHONE (OUTPATIENT)
Dept: GASTROENTEROLOGY | Facility: CLINIC | Age: 69
End: 2024-05-04
Payer: COMMERCIAL

## 2024-05-04 DIAGNOSIS — Z12.11 SPECIAL SCREENING FOR MALIGNANT NEOPLASMS, COLON: Primary | ICD-10-CM

## 2024-05-04 NOTE — TELEPHONE ENCOUNTER
Pre visit planning completed.      Procedure details:    Patient scheduled for Colonoscopy  on 5.15.2024.     Arrival time: 1315. Procedure time 1400    Facility location: [facility]; [facility] [place] [address]. Check in location: 1st Floor [facility].     Sedation type: Conscious sedation     Pre op exam needed? N/A    Indication for procedure: screening colonoscpoy, + cologuard      Chart review:     Electronic implanted devices? No    Recent diagnosis of diverticulitis within the last 6 weeks? No    Diabetic? No      Medication review:    Anticoagulants? No    NSAIDS? No NSAID medications per patient's medication list.  RN will verify with pre-assessment call.    Other medication HOLDING recommendations:  Cannabis/Marijuana: Stop night before procedure.      Prep for procedure:     Bowel prep recommendation: Standard Miralax  Due to: standard bowel prep.    Prep instructions sent via carlos Lala RN  Endoscopy Procedure Pre Assessment RN  [phone] option 4

## 2024-05-06 RX ORDER — POLYETHYLENE GLYCOL 3350 17 G/17G
POWDER, FOR SOLUTION ORAL
Qty: 238 G | Refills: 0 | Status: SHIPPED | OUTPATIENT
Start: 2024-05-06

## 2024-05-06 RX ORDER — BISACODYL 5 MG/1
TABLET, DELAYED RELEASE ORAL
Qty: 4 TABLET | Refills: 0 | Status: SHIPPED | OUTPATIENT
Start: 2024-05-06

## 2024-05-06 NOTE — TELEPHONE ENCOUNTER
"Pre assessment completed for upcoming procedure.   (Please see previous telephone encounter notes for complete details)    Patient  returned call.       Procedure details:    Arrival time and facility location reviewed.    Pre op exam needed? N/A    Designated  policy reviewed. Instructed to have someone stay 6  hours post procedure.       Medication review:    Medications reviewed. Please see supporting documentation below. Holding recommendations discussed (if applicable).  Does not take NSAIDs.       Prep for procedure:     Procedure prep instructions reviewed.  Prep changed to low volume golytely- patient did report that they wanted him to get \"cleaned out better\" after last colonoscopy. Patient stated that they have a very long colon. Writer did discuss sedation with patient as having a long colon with conscious sedation can sometimes be uncomfortable. Patient reported that they had conscious sedation last time and remember being partially awake. Patient reported that they did not have any discomfort even with the abdominal pressure that was applied to patient's abdomen during procedure.    Writer elected to change prep to low volume ext golytely upon discussion with patient (patient was not amenable to ext. Golytely). Reviewed instructions with patient and patient understood. Prep script sent to [facility].      Any additional information needed:  N/A      Patient  verbalized understanding and had no questions or concerns at this time.      Tessa Nuñez RN  Endoscopy Procedure Pre Assessment RN  [phone] option 4  "

## 2024-05-06 NOTE — TELEPHONE ENCOUNTER
Attempted to contact patient in order to complete pre assessment questions.     No answer. Left message to return call to [phone] option 4    Callback communication sent via [employer].    Nimisha Lala RN

## 2024-05-14 RX ORDER — LIDOCAINE 40 MG/G
CREAM TOPICAL
Status: CANCELLED | OUTPATIENT
Start: 2024-05-14

## 2024-05-14 RX ORDER — ONDANSETRON 2 MG/ML
4 INJECTION INTRAMUSCULAR; INTRAVENOUS
Status: CANCELLED | OUTPATIENT
Start: 2024-05-14

## 2024-05-15 ENCOUNTER — HOSPITAL ENCOUNTER (OUTPATIENT)
Facility: CLINIC | Age: 69
Discharge: HOME | End: 2024-05-15
Attending: COLON & RECTAL SURGERY | Admitting: COLON & RECTAL SURGERY
Payer: COMMERCIAL

## 2024-05-15 VITALS
HEIGHT: 73 IN | HEART RATE: 59 BPM | WEIGHT: 170 LBS | SYSTOLIC BLOOD PRESSURE: 126 MMHG | DIASTOLIC BLOOD PRESSURE: 80 MMHG | RESPIRATION RATE: 11 BRPM | OXYGEN SATURATION: 100 % | BODY MASS INDEX: 22.53 KG/M2

## 2024-05-15 LAB — COLONOSCOPY: NORMAL

## 2024-05-15 PROCEDURE — 88305 TISSUE EXAM BY PATHOLOGIST: CPT | Mod: TC | Performed by: COLON & RECTAL SURGERY

## 2024-05-15 PROCEDURE — 88305 TISSUE EXAM BY PATHOLOGIST: CPT | Mod: 26 | Performed by: PATHOLOGY

## 2024-05-15 PROCEDURE — 45385 COLONOSCOPY W/LESION REMOVAL: CPT | Performed by: COLON & RECTAL SURGERY

## 2024-05-15 PROCEDURE — 250N000011 HC RX IP 250 OP 636: Performed by: COLON & RECTAL SURGERY

## 2024-05-15 PROCEDURE — G0500 MOD SEDAT ENDO SERVICE >5YRS: HCPCS | Mod: PT | Performed by: COLON & RECTAL SURGERY

## 2024-05-15 RX ORDER — FENTANYL CITRATE 50 UG/ML
INJECTION, SOLUTION INTRAMUSCULAR; INTRAVENOUS PRN
Status: DISCONTINUED | OUTPATIENT
Start: 2024-05-15 | End: 2024-05-15 | Stop reason: HOSPADM

## 2024-05-15 ASSESSMENT — ACTIVITIES OF DAILY LIVING (ADL)
ADLS_ACUITY_SCORE: 35
ADLS_ACUITY_SCORE: 35

## 2024-05-15 NOTE — H&P
Colon & Rectal Surgery History and Physical  Pre-Endoscopy Procedure Note    History of Present Illness   I have been asked by Dr. Vitale to evaluate this 69 year old male for positive Cologuard. He currently denies any abdominal pain, weight loss, bleeding per rectum, or recent change in bowel habits.    Past Medical History  Past Medical History:   Diagnosis Date    High cholesterol 9/1/2020    Other male erectile dysfunction 9/1/2020    Psoriasis 9/1/2020       Past Surgical History  Procedure Laterality Date    EYE SURGERY          Medications     Medications Prior to Admission   Medication Sig    atorvastatin (LIPITOR) 10 MG tablet Take 1 tablet (10 mg) by mouth daily    carboxymethylcellulose PF (CARBOXYMETHYLCELLULOSE SODIUM) 0.5 % ophthalmic solution Place 1 drop into both eyes 4 times daily Preservative free artificial tears, single use vials. Aram 3/flaxseed.    Eyelid Cleansers (OCUSOFT EYELID CLEANSING) PADS Externally apply 1 Box topically 2 times daily    tadalafil (CIALIS) 10 MG tablet Take 1 tablet (10 mg) by mouth daily    tacrolimus (PROTOPIC) 0.1 % external ointment Apply topically 2 times daily As needed, to affected skin, using small amount.       Allergies   No Known Allergies     Family History   Family history includes Alcoholism in his brother; Breast Cancer in his sister; Lung Cancer in his mother; Macular Degeneration in his father; Post-Traumatic Stress Disorder (PTSD) in his brother; Throat cancer in his father and mother.     Social History   He reports that he quit smoking about 9 years ago. His smoking use included cigarettes. He has never used smokeless tobacco. He reports current alcohol use of about 5.0 - 6.0 standard drinks of alcohol per week. He reports current drug use. Drug: Marijuana.    Review of Systems   Constitutional:  No fever, weight change or fatigue.    Eyes:     No dry eyes or vision changes.   Ears/Nose/Throat/Neck:  No oral ulcers, sore throat or voice change.  "   Cardiovascular:   No palpitations, syncope, angina or edema.   Respiratory:    No chest pain, excessive sleepiness, shortness of breath or hemoptysis.    Gastrointestinal:   No abdominal pain, nausea, vomiting, diarrhea or heartburn.    Genitourinary:   No dysuria, hematuria, urinary retention or urinary frequency.   Musculoskeletal:  No joint swelling or arthralgias.    Dermatologic:  No skin rash or other skin changes.   Neurologic:    No focal weakness or numbness. No neuropathy.   Psychiatric:    No depression, anxiety, suicidal ideation, or paranoid ideation.   Endocrine:   No cold or heat intolerance, polydipsia, hirsutism, change in libido, or flushing.   Hematology/Lymphatic:  No bleeding or lymphadenopathy.    Allergy/Immunology:  No rhinitis or hives.     Physical Exam   Vitals:  Blood pressure 144/87, pulse 70, resp. rate 16, height 1.854 m (6' 1\"), weight 77.1 kg (170 lb), SpO2 100%.    General:  Alert and oriented to person, place and time   Airway: Normal oropharyngeal airway and neck mobility   Lungs:  Clear bilaterally   Heart:  Regular rate and rhythm   Abdomen: Soft, NT, ND, no masses   Extremities: Warm, good capillary refill    ASA Grade: II (mild systemic disease)    Impression: Cleared for use of conscious sedation for evaluation of positive Cologuard    Plan: Proceed with colonoscopy     Katt Brady MD  [facility]  [phone]  "

## 2024-05-16 ENCOUNTER — OFFICE VISIT (OUTPATIENT)
Dept: AUDIOLOGY | Facility: CLINIC | Age: 69
End: 2024-05-16
Attending: FAMILY MEDICINE
Payer: COMMERCIAL

## 2024-05-16 DIAGNOSIS — H90.A32 MIXED CONDUCTIVE AND SENSORINEURAL HEARING LOSS OF LEFT EAR WITH RESTRICTED HEARING OF RIGHT EAR: Primary | ICD-10-CM

## 2024-05-16 DIAGNOSIS — H69.92 DYSFUNCTION OF LEFT EUSTACHIAN TUBE: ICD-10-CM

## 2024-05-16 DIAGNOSIS — H91.90 DECREASED HEARING, UNSPECIFIED LATERALITY: ICD-10-CM

## 2024-05-16 LAB
PATH REPORT.COMMENTS IMP SPEC: NORMAL
PATH REPORT.COMMENTS IMP SPEC: NORMAL
PATH REPORT.FINAL DX SPEC: NORMAL
PATH REPORT.GROSS SPEC: NORMAL
PATH REPORT.MICROSCOPIC SPEC OTHER STN: NORMAL
PATH REPORT.RELEVANT HX SPEC: NORMAL
PHOTO IMAGE: NORMAL

## 2024-05-16 PROCEDURE — 92557 COMPREHENSIVE HEARING TEST: CPT | Performed by: AUDIOLOGIST

## 2024-05-16 PROCEDURE — 92550 TYMPANOMETRY & REFLEX THRESH: CPT | Mod: 52 | Performed by: AUDIOLOGIST

## 2024-05-16 NOTE — PROGRESS NOTES
AUDIOLOGY REPORT    SUBJECTIVE:  Michele Queen is a 69 year old male who was seen in the Audiology Clinic at the [facility] for audiologic evaluation, referred by Toan Vitale M.D. The patient reported that his daughter has concerns for his hearing ability after two months of travel time together. He noted some difficulty hearing in noisy/busier places, but has no other hearing or ear concerns. He denied dizziness, tinnitus, otalgia, otorrhea, recent illness, aural fullness, or history of significant noise exposure. He was accompanied by his spouse, Soha, whose hearing was also assessed today.     OBJECTIVE:  Abuse Screening:  Do you feel unsafe at home or work/school? No  Do you feel threatened by someone? No  Does anyone try to keep you from having contact with others, or doing things outside of your home? No  Physical signs of abuse present? No     Fall Risk Screen:  1. Have you fallen two or more times in the past year? No  2. Have you fallen and had an injury in the past year? No    Is patient a fall risk? No  Referral initiated: No  Fall Risk Assessment Completed by Audiology    Otoscopic exam indicates ears are clear of cerumen, bilaterally.     Pure Tone Thresholds assessed using conventional audiometry with good  reliability from 250-8000 Hz bilaterally using insert earphones and circumaural headphones.     RIGHT:  normal sloping to moderate, likely sensorineural hearing loss affecting 8820-9199 Hz only.    LEFT:    Moderate, rising to mild conductive hearing loss for 250-750 Hz, rising to normal/borderline normal hearing sensitivity for 0366-3648 Hz, sloping to mild, likely sensorineural hearing loss affecting 7394-7549 Hz only.    Tympanogram:    RIGHT: normal eardrum mobility    LEFT:   restricted eardrum mobility/flat tracing  Normal/similar ear canal volumes were measured between ears.     Reflexes for 1000 Hz (reported by stimulus ear):  RIGHT: Ipsilateral is present at  normal levels  RIGHT: Contralateral was unable to be tested due to equipment issues  LEFT:   Ipsilateral unable to be tested  LEFT:   Contralateral unable to be tested    Speech Reception Threshold:    RIGHT: 10 dB HL    LEFT:   20 dB HL  Word Recognition Score:     RIGHT: 100% at 50 dB HL using NU-6 recorded word list.    LEFT:   100% at 65 dB HL using NU-6 recorded word list.      ASSESSMENT:     ICD-10-CM    1. Mixed conductive and sensorineural hearing loss of left ear with restricted hearing of right ear  H90.A32       2. Decreased hearing, unspecified laterality  H91.90 Adult Audiology  Referral      3. Dysfunction of left eustachian tube  H69.92           Today s results were discussed with the patient in detail. Appropriate communication strategies were discussed at length, as were reasonable expectations regarding hearing at a distance, in noisy environments, or when attention is diverted elsewhere.    ENT referral is indicated due to abnormal middle ear function in the left ear, and mild-moderate mixed hearing loss (low frequency conductive component present in the left ear).     PLAN:  Patient was counseled regarding hearing loss and impact on communication.  Mr. Queen is not yet an ideal amplification candidate at this time. Hearing evaluation should be repeated following ENT treatment, or at least annually to monitor hearing thresholds. Wear hearing protection consistently in noise to preserve residual hearing sensitivity and to minimize the effects of noise on tinnitus. Mr. Queen expressed verbal understanding of this information and plan, although expressed some hesitancy regarding ENT consult. Please call this clinic with questions regarding these results or recommendations.        Colin Young, [facility]A  [place] Licensed Audiologist [id number]

## 2024-05-29 ENCOUNTER — PATIENT OUTREACH (OUTPATIENT)
Dept: GASTROENTEROLOGY | Facility: CLINIC | Age: 69
End: 2024-05-29
Payer: COMMERCIAL

## 2024-06-20 ENCOUNTER — MYC MEDICAL ADVICE (OUTPATIENT)
Dept: FAMILY MEDICINE | Facility: CLINIC | Age: 69
End: 2024-06-20

## 2024-07-08 ENCOUNTER — TRANSFERRED RECORDS (OUTPATIENT)
Dept: HEALTH INFORMATION MANAGEMENT | Facility: CLINIC | Age: 69
End: 2024-07-08
Payer: COMMERCIAL

## 2024-12-04 ENCOUNTER — MYC MEDICAL ADVICE (OUTPATIENT)
Dept: FAMILY MEDICINE | Facility: CLINIC | Age: 69
End: 2024-12-04

## 2024-12-04 DIAGNOSIS — L40.9 PSORIASIS: Primary | ICD-10-CM

## 2024-12-04 RX ORDER — TACROLIMUS 1 MG/G
OINTMENT TOPICAL 2 TIMES DAILY PRN
Qty: 60 G | Refills: 1 | Status: SHIPPED | OUTPATIENT
Start: 2024-12-04

## 2024-12-04 NOTE — TELEPHONE ENCOUNTER
correspondence. Rx as noted.     Diagnoses and all orders for this visit:    Psoriasis  -     tacrolimus (PROTOPIC) 0.1 % external ointment; Apply topically 2 times daily as needed (psoriasis).      Toan Vitale MD  3:57 PM, December 4, 2024

## 2024-12-10 ENCOUNTER — TELEPHONE (OUTPATIENT)
Dept: FAMILY MEDICINE | Facility: CLINIC | Age: 69
End: 2024-12-10

## 2024-12-10 NOTE — TELEPHONE ENCOUNTER
Prior Authorization Retail Medication Request    Medication/Dose: tacrolimus (PROTOPIC) 0.1 % external ointment  Diagnosis and ICD code (if different than what is on RX):  Same  New/renewal/insurance change PA/secondary ins. PA: Renewal  Previously Tried and Failed:  N/A  Rationale:  N/A    Insurance   Primary: Same  Insurance ID:  Same    Secondary (if applicable): N/A  Insurance ID:  N/A    Pharmacy Information (if different than what is on RX)  Name:  Same  Phone:  Same  Fax:  Same    Clinic Information  Preferred [place] for [place] [facility] [place] PEDRO RICH, RN  12/10/24 10:04 AM

## 2024-12-12 NOTE — TELEPHONE ENCOUNTER
PA Initiation    Medication: TACROLIMUS 0.1 % EX OINT  Insurance Company: [employer] Part D - Phone [phone] Fax [phone]  Pharmacy Filling the Rx: [facility] - [place], [place] - [address]  Filling Pharmacy Phone: [phone]  Filling Pharmacy Fax:    Start Date: 12/12/2024

## 2024-12-16 ENCOUNTER — MYC MEDICAL ADVICE (OUTPATIENT)
Dept: FAMILY MEDICINE | Facility: CLINIC | Age: 69
End: 2024-12-16

## 2024-12-16 DIAGNOSIS — L40.9 PSORIASIS: Primary | ICD-10-CM

## 2024-12-16 NOTE — TELEPHONE ENCOUNTER
PRIOR AUTHORIZATION DENIED    Medication: TACROLIMUS 0.1 % EX OINT  Insurance Company: [employer] Part D - Phone [phone] Fax [phone]  Denial Date: 12/16/2024  Denial Reason(s): Needs to try/fail formulary alternatives      Appeal Information:   Patient Notified: No

## 2024-12-17 RX ORDER — FLUOCINONIDE 0.5 MG/G
CREAM TOPICAL 2 TIMES DAILY
Qty: 60 G | Refills: 1 | Status: SHIPPED | OUTPATIENT
Start: 2024-12-17

## 2024-12-17 NOTE — TELEPHONE ENCOUNTER
Rx sent as noted below.    Diagnoses and all orders for this visit:    Psoriasis  -     fluocinonide (LIDEX) 0.05 % external cream; Apply topically 2 times daily. Do not apply for more than 7 days consecutively      Toan Vitale MD  3:34 PM, December 17, 2024

## 2024-12-31 DIAGNOSIS — N52.8 OTHER MALE ERECTILE DYSFUNCTION: ICD-10-CM

## 2024-12-31 RX ORDER — TADALAFIL 10 MG/1
10 TABLET ORAL DAILY
Qty: 90 TABLET | Refills: 1 | Status: SHIPPED | OUTPATIENT
Start: 2024-12-31

## 2024-12-31 NOTE — TELEPHONE ENCOUNTER
Medication requested: tadalafil (CIALIS) 10 MG tablet   Last office visit: 4/9/24  [facility] appointments: none  Medication last refilled: 12/1/23; 90 + 4 refills  Last qualifying labs: N/A    Prescription approved per [facility] Refill Protocol.    John RICH, RN  12/31/24 1:13 PM

## 2025-03-26 DIAGNOSIS — N52.8 OTHER MALE ERECTILE DYSFUNCTION: ICD-10-CM

## 2025-03-26 RX ORDER — TADALAFIL 10 MG/1
10 TABLET ORAL DAILY
Qty: 90 TABLET | Refills: 0 | Status: SHIPPED | OUTPATIENT
Start: 2025-03-26

## 2025-03-26 NOTE — TELEPHONE ENCOUNTER
Medication requested: tadalafil (CIALIS) 10 MG tablet   Last office visit: 4/9/2024  [facility] appointments: none  Medication last refilled: 12/31/2024; 90 tabs + 1 refill  Last qualifying labs: n/a    Prescription approved per [facility] Refill Protocol.    HILARIO Carreon, RN  03/26/25, 2:37 PM

## 2025-03-31 DIAGNOSIS — E78.00 HIGH CHOLESTEROL: ICD-10-CM

## 2025-04-03 RX ORDER — ATORVASTATIN CALCIUM 10 MG/1
10 TABLET, FILM COATED ORAL DAILY
Qty: 30 TABLET | Refills: 0 | Status: SHIPPED | OUTPATIENT
Start: 2025-04-03

## 2025-04-03 NOTE — TELEPHONE ENCOUNTER
Atorvastatin (Lipitor) 10 mg    Last Office Visit: 4/9/24  Future [facility] [place] Appointments: 4/15/25  Medication last refilled: 4/17/24 #90 with 3 refill(s)    Required labs per protocol:    LAB REF RANGE 11/9/22 4/9/24   LDL < 100 mg/dL 101 High 114 High     Medication is being filled for 1 time refill only due to:  Patient needs labs Lipid panel. Patient needs to be seen because it has been more than one year since last visit. Has appt on 4/15/25.    Kenya White, TONYAN, RN, CCM

## 2025-04-11 SDOH — HEALTH STABILITY: PHYSICAL HEALTH: ON AVERAGE, HOW MANY MINUTES DO YOU ENGAGE IN EXERCISE AT THIS LEVEL?: 60 MIN

## 2025-04-11 SDOH — HEALTH STABILITY: PHYSICAL HEALTH: ON AVERAGE, HOW MANY DAYS PER WEEK DO YOU ENGAGE IN MODERATE TO STRENUOUS EXERCISE (LIKE A BRISK WALK)?: 6 DAYS

## 2025-04-11 ASSESSMENT — SOCIAL DETERMINANTS OF HEALTH (SDOH): HOW OFTEN DO YOU GET TOGETHER WITH FRIENDS OR RELATIVES?: MORE THAN THREE TIMES A WEEK

## 2025-04-15 ENCOUNTER — OFFICE VISIT (OUTPATIENT)
Dept: FAMILY MEDICINE | Facility: CLINIC | Age: 70
End: 2025-04-15
Payer: COMMERCIAL

## 2025-04-15 VITALS
SYSTOLIC BLOOD PRESSURE: 126 MMHG | HEIGHT: 73 IN | DIASTOLIC BLOOD PRESSURE: 78 MMHG | HEART RATE: 79 BPM | RESPIRATION RATE: 15 BRPM | OXYGEN SATURATION: 99 % | TEMPERATURE: 98.1 F | BODY MASS INDEX: 23.99 KG/M2 | WEIGHT: 181 LBS

## 2025-04-15 DIAGNOSIS — E78.00 HIGH CHOLESTEROL: ICD-10-CM

## 2025-04-15 DIAGNOSIS — Z51.81 ENCOUNTER FOR THERAPEUTIC DRUG MONITORING: ICD-10-CM

## 2025-04-15 DIAGNOSIS — Z00.00 WELLNESS EXAMINATION: Primary | ICD-10-CM

## 2025-04-15 DIAGNOSIS — N52.8 OTHER MALE ERECTILE DYSFUNCTION: ICD-10-CM

## 2025-04-15 LAB
CHOLEST SERPL-MCNC: 181 MG/DL
FASTING STATUS PATIENT QL REPORTED: NO
HDLC SERPL-MCNC: 76 MG/DL
LDLC SERPL CALC-MCNC: 75 MG/DL
NONHDLC SERPL-MCNC: 105 MG/DL
TRIGL SERPL-MCNC: 150 MG/DL

## 2025-04-15 PROCEDURE — 82465 ASSAY BLD/SERUM CHOLESTEROL: CPT | Mod: ORL | Performed by: FAMILY MEDICINE

## 2025-04-15 RX ORDER — ATORVASTATIN CALCIUM 10 MG/1
10 TABLET, FILM COATED ORAL DAILY
Qty: 30 TABLET | Refills: 0 | Status: SHIPPED | OUTPATIENT
Start: 2025-04-15

## 2025-04-15 RX ORDER — TADALAFIL 10 MG/1
10 TABLET ORAL DAILY
Qty: 90 TABLET | Refills: 0 | Status: SHIPPED | OUTPATIENT
Start: 2025-04-15

## 2025-04-15 ASSESSMENT — PAIN SCALES - GENERAL: PAINLEVEL_OUTOF10: NO PAIN (0)

## 2025-04-15 NOTE — NURSING NOTE
"70 year old    Chief Complaint   Patient presents with    Physical     Medication questions        Blood pressure (!) 150/83, pulse 79, temperature 98.1  F (36.7  C), temperature source Skin, resp. rate 15, height 1.865 m (6' 1.43\"), weight 82.1 kg (181 lb), SpO2 99%. Body mass index is 23.6 kg/m .    Patient Active Problem List   Diagnosis    High cholesterol    Psoriasis    Other male erectile dysfunction    Mixed conductive and sensorineural hearing loss of left ear with restricted hearing of right ear          Wt Readings from Last 2 Encounters:   04/15/25 82.1 kg (181 lb)   05/15/24 77.1 kg (170 lb)       BP Readings from Last 3 Encounters:   04/15/25 (!) 150/83   05/15/24 126/80   04/09/24 (!) 143/83             Current Outpatient Medications   Medication Sig Dispense Refill    atorvastatin (LIPITOR) 10 MG tablet Take 1 tablet (10 mg) by mouth daily. 30 tablet 0    tacrolimus (PROTOPIC) 0.1 % external ointment Apply topically 2 times daily as needed (psoriasis). 60 g 1    tadalafil (CIALIS) 10 MG tablet Take 1 tablet (10 mg) by mouth daily. 90 tablet 0     No current facility-administered medications for this visit.          Social History     Tobacco Use    Smoking status: Former     Current packs/day: 0.00     Types: Cigarettes     Quit date: 7/24/2014     Years since quitting: 10.7    Smokeless tobacco: Never   Substance Use Topics    Alcohol use: Yes     Alcohol/week: 5.0 - 6.0 standard drinks of alcohol     Types: 5 - 6 Standard drinks or equivalent per week     Comment: weekends    Drug use: Yes     Types: Marijuana     Comment: occassionally          Health Maintenance Due   Topic Date Due    HEPATITIS C SCREENING  Never done    LUNG CANCER SCREENING  Never done    LIPID  04/09/2025    MEDICARE ANNUAL WELLNESS VISIT  04/09/2025    COVID-19 Vaccine (8 - 2024-25 season) 04/22/2025        No results found for: \"PAP\"        April 15, 2025 10:09 AM        "
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
## 2025-04-20 ENCOUNTER — HEALTH MAINTENANCE LETTER (OUTPATIENT)
Age: 70
End: 2025-04-20

## 2025-05-20 ENCOUNTER — MYC REFILL (OUTPATIENT)
Dept: FAMILY MEDICINE | Facility: CLINIC | Age: 70
End: 2025-05-20

## 2025-05-20 DIAGNOSIS — E78.00 HIGH CHOLESTEROL: ICD-10-CM

## 2025-05-22 RX ORDER — ATORVASTATIN CALCIUM 10 MG/1
10 TABLET, FILM COATED ORAL DAILY
Qty: 90 TABLET | Refills: 3 | Status: SHIPPED | OUTPATIENT
Start: 2025-05-22

## 2025-05-22 NOTE — TELEPHONE ENCOUNTER
Medication requested: atorvastatin (LIPITOR) 10 MG tablet   Last office visit: 4/15/25  [facility] appointments: none  Medication last refilled: 4/15/25; 30 + 0 refills  Last qualifying labs:   Component      Latest Ref Rng 4/9/2024  4:07 PM   Cholesterol      <200 mg/dL 205 (H)    Triglycerides      <150 mg/dL 66    HDL Cholesterol      >=40 mg/dL 78    LDL Cholesterol Calculated      <=100 mg/dL 114 (H)    Non HDL Cholesterol      <130 mg/dL 127    Patient Fasting? No       Routing refill request to provider for review/approval because:  Labs not current:  lipid panel    John RICH, RN  05/22/25 9:19 AM

## 2025-05-22 NOTE — TELEPHONE ENCOUNTER
Med refilled as noted.     Don was seen today for refill request.    Diagnoses and all orders for this visit:    High cholesterol  -     atorvastatin (LIPITOR) 10 MG tablet; Take 1 tablet (10 mg) by mouth daily.      Toan Vitale MD  1:56 PM, May 22, 2025

## 2025-06-02 ENCOUNTER — TRANSFERRED RECORDS (OUTPATIENT)
Dept: HEALTH INFORMATION MANAGEMENT | Facility: CLINIC | Age: 70
End: 2025-06-02
Payer: COMMERCIAL

## (undated) RX ORDER — FENTANYL CITRATE 50 UG/ML
INJECTION, SOLUTION INTRAMUSCULAR; INTRAVENOUS
Status: DISPENSED
Start: 2024-05-15